# Patient Record
Sex: FEMALE | Race: OTHER | HISPANIC OR LATINO | Employment: FULL TIME | URBAN - METROPOLITAN AREA
[De-identification: names, ages, dates, MRNs, and addresses within clinical notes are randomized per-mention and may not be internally consistent; named-entity substitution may affect disease eponyms.]

---

## 2021-05-19 ENCOUNTER — TELEPHONE (OUTPATIENT)
Dept: NEUROLOGY | Facility: CLINIC | Age: 61
End: 2021-05-19

## 2022-02-02 ENCOUNTER — OFFICE VISIT (OUTPATIENT)
Dept: NEUROLOGY | Facility: CLINIC | Age: 62
End: 2022-02-02
Payer: COMMERCIAL

## 2022-02-02 VITALS
HEART RATE: 82 BPM | HEIGHT: 61 IN | DIASTOLIC BLOOD PRESSURE: 84 MMHG | SYSTOLIC BLOOD PRESSURE: 115 MMHG | BODY MASS INDEX: 28.05 KG/M2 | WEIGHT: 148.56 LBS

## 2022-02-02 DIAGNOSIS — R41.3 OTHER AMNESIA: ICD-10-CM

## 2022-02-02 DIAGNOSIS — G31.84 MILD COGNITIVE IMPAIRMENT WITH MEMORY LOSS: Primary | ICD-10-CM

## 2022-02-02 DIAGNOSIS — F32.A DEPRESSION, UNSPECIFIED DEPRESSION TYPE: ICD-10-CM

## 2022-02-02 PROBLEM — E78.00 HYPERCHOLESTEROLEMIA: Status: ACTIVE | Noted: 2020-07-04

## 2022-02-02 PROCEDURE — 3074F PR MOST RECENT SYSTOLIC BLOOD PRESSURE < 130 MM HG: ICD-10-PCS | Mod: CPTII,S$GLB,, | Performed by: PSYCHIATRY & NEUROLOGY

## 2022-02-02 PROCEDURE — 1159F PR MEDICATION LIST DOCUMENTED IN MEDICAL RECORD: ICD-10-PCS | Mod: CPTII,S$GLB,, | Performed by: PSYCHIATRY & NEUROLOGY

## 2022-02-02 PROCEDURE — 3079F DIAST BP 80-89 MM HG: CPT | Mod: CPTII,S$GLB,, | Performed by: PSYCHIATRY & NEUROLOGY

## 2022-02-02 PROCEDURE — 99999 PR PBB SHADOW E&M-EST. PATIENT-LVL IV: CPT | Mod: PBBFAC,,, | Performed by: PSYCHIATRY & NEUROLOGY

## 2022-02-02 PROCEDURE — 1159F MED LIST DOCD IN RCRD: CPT | Mod: CPTII,S$GLB,, | Performed by: PSYCHIATRY & NEUROLOGY

## 2022-02-02 PROCEDURE — 99999 PR PBB SHADOW E&M-EST. PATIENT-LVL IV: ICD-10-PCS | Mod: PBBFAC,,, | Performed by: PSYCHIATRY & NEUROLOGY

## 2022-02-02 PROCEDURE — 1160F RVW MEDS BY RX/DR IN RCRD: CPT | Mod: CPTII,S$GLB,, | Performed by: PSYCHIATRY & NEUROLOGY

## 2022-02-02 PROCEDURE — 99205 PR OFFICE/OUTPT VISIT, NEW, LEVL V, 60-74 MIN: ICD-10-PCS | Mod: S$GLB,,, | Performed by: PSYCHIATRY & NEUROLOGY

## 2022-02-02 PROCEDURE — 99205 OFFICE O/P NEW HI 60 MIN: CPT | Mod: S$GLB,,, | Performed by: PSYCHIATRY & NEUROLOGY

## 2022-02-02 PROCEDURE — 3008F PR BODY MASS INDEX (BMI) DOCUMENTED: ICD-10-PCS | Mod: CPTII,S$GLB,, | Performed by: PSYCHIATRY & NEUROLOGY

## 2022-02-02 PROCEDURE — 3074F SYST BP LT 130 MM HG: CPT | Mod: CPTII,S$GLB,, | Performed by: PSYCHIATRY & NEUROLOGY

## 2022-02-02 PROCEDURE — 3079F PR MOST RECENT DIASTOLIC BLOOD PRESSURE 80-89 MM HG: ICD-10-PCS | Mod: CPTII,S$GLB,, | Performed by: PSYCHIATRY & NEUROLOGY

## 2022-02-02 PROCEDURE — 1160F PR REVIEW ALL MEDS BY PRESCRIBER/CLIN PHARMACIST DOCUMENTED: ICD-10-PCS | Mod: CPTII,S$GLB,, | Performed by: PSYCHIATRY & NEUROLOGY

## 2022-02-02 PROCEDURE — 3008F BODY MASS INDEX DOCD: CPT | Mod: CPTII,S$GLB,, | Performed by: PSYCHIATRY & NEUROLOGY

## 2022-02-02 RX ORDER — DEXAMETHASONE SODIUM PHOSPHATE 4 MG/ML
1 INJECTION, SOLUTION INTRA-ARTICULAR; INTRALESIONAL; INTRAMUSCULAR; INTRAVENOUS; SOFT TISSUE
COMMUNITY
End: 2022-03-17

## 2022-02-02 RX ORDER — DULOXETIN HYDROCHLORIDE 30 MG/1
30 CAPSULE, DELAYED RELEASE ORAL DAILY
Qty: 30 CAPSULE | Refills: 2 | Status: SHIPPED | OUTPATIENT
Start: 2022-02-02 | End: 2022-03-17

## 2022-02-02 RX ORDER — ESTRADIOL 0.1 MG/G
CREAM VAGINAL
COMMUNITY
End: 2022-11-17 | Stop reason: SDUPTHER

## 2022-02-04 ENCOUNTER — PATIENT MESSAGE (OUTPATIENT)
Dept: NEUROLOGY | Facility: CLINIC | Age: 62
End: 2022-02-04
Payer: COMMERCIAL

## 2022-02-23 ENCOUNTER — OFFICE VISIT (OUTPATIENT)
Dept: NEUROLOGY | Facility: CLINIC | Age: 62
End: 2022-02-23
Payer: COMMERCIAL

## 2022-02-23 DIAGNOSIS — F43.22 ADJUSTMENT DISORDER WITH ANXIOUS MOOD: ICD-10-CM

## 2022-02-23 DIAGNOSIS — F32.A DEPRESSION, UNSPECIFIED DEPRESSION TYPE: ICD-10-CM

## 2022-02-23 DIAGNOSIS — G31.84 MILD COGNITIVE IMPAIRMENT WITH MEMORY LOSS: Primary | ICD-10-CM

## 2022-02-23 PROCEDURE — 99499 UNLISTED E&M SERVICE: CPT | Mod: 95,,, | Performed by: CLINICAL NEUROPSYCHOLOGIST

## 2022-02-23 PROCEDURE — 99499 NO LOS: ICD-10-PCS | Mod: 95,,, | Performed by: CLINICAL NEUROPSYCHOLOGIST

## 2022-02-23 PROCEDURE — 96116 PR NEUROBEHAVIORAL STATUS EXAM BY PSYCH/PHYS: ICD-10-PCS | Mod: 95,,, | Performed by: CLINICAL NEUROPSYCHOLOGIST

## 2022-02-23 PROCEDURE — 96116 NUBHVL XM PHYS/QHP 1ST HR: CPT | Mod: 95,,, | Performed by: CLINICAL NEUROPSYCHOLOGIST
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
## 2022-03-14 ENCOUNTER — OFFICE VISIT (OUTPATIENT)
Dept: NEUROLOGY | Facility: CLINIC | Age: 62
End: 2022-03-14
Payer: COMMERCIAL

## 2022-03-14 DIAGNOSIS — F32.A DEPRESSION, UNSPECIFIED DEPRESSION TYPE: ICD-10-CM

## 2022-03-14 DIAGNOSIS — G31.84 MILD COGNITIVE IMPAIRMENT WITH MEMORY LOSS: Primary | ICD-10-CM

## 2022-03-14 DIAGNOSIS — R41.3 MEMORY LOSS: ICD-10-CM

## 2022-03-14 DIAGNOSIS — F43.23 ADJUSTMENT DISORDER WITH MIXED ANXIETY AND DEPRESSED MOOD: ICD-10-CM

## 2022-03-14 PROCEDURE — 96133 NRPSYC TST EVAL PHYS/QHP EA: CPT | Mod: S$GLB,,, | Performed by: CLINICAL NEUROPSYCHOLOGIST

## 2022-03-14 PROCEDURE — 96138 PSYCL/NRPSYC TECH 1ST: CPT | Mod: S$GLB,,, | Performed by: CLINICAL NEUROPSYCHOLOGIST

## 2022-03-14 PROCEDURE — 96132 NRPSYC TST EVAL PHYS/QHP 1ST: CPT | Mod: S$GLB,,, | Performed by: CLINICAL NEUROPSYCHOLOGIST

## 2022-03-14 PROCEDURE — 99999 PR PBB SHADOW E&M-EST. PATIENT-LVL II: ICD-10-PCS | Mod: PBBFAC,,,

## 2022-03-14 PROCEDURE — 96139 PSYCL/NRPSYC TST TECH EA: CPT | Mod: S$GLB,,, | Performed by: CLINICAL NEUROPSYCHOLOGIST

## 2022-03-14 PROCEDURE — 96139 PR PSYCH/NEUROPSYCH TEST ADMIN/SCORING, BY TECH, 2+ TESTS, EA ADDTL 30 MIN: ICD-10-PCS | Mod: S$GLB,,, | Performed by: CLINICAL NEUROPSYCHOLOGIST

## 2022-03-14 PROCEDURE — 96132 PR NEUROPSYCHOLOGIC TEST EVAL SVCS, 1ST HR: ICD-10-PCS | Mod: S$GLB,,, | Performed by: CLINICAL NEUROPSYCHOLOGIST

## 2022-03-14 PROCEDURE — 99499 UNLISTED E&M SERVICE: CPT | Mod: S$GLB,,, | Performed by: CLINICAL NEUROPSYCHOLOGIST

## 2022-03-14 PROCEDURE — 96133 PR NEUROPSYCHOLOGIC TEST EVAL SVCS, EA ADDTL HR: ICD-10-PCS | Mod: S$GLB,,, | Performed by: CLINICAL NEUROPSYCHOLOGIST

## 2022-03-14 PROCEDURE — 96138 PR PSYCH/NEUROPSYCH TEST ADMIN/SCORING, BY TECH, 2+ TESTS, 1ST 30 MIN: ICD-10-PCS | Mod: S$GLB,,, | Performed by: CLINICAL NEUROPSYCHOLOGIST

## 2022-03-14 PROCEDURE — 99999 PR PBB SHADOW E&M-EST. PATIENT-LVL II: CPT | Mod: PBBFAC,,,

## 2022-03-14 PROCEDURE — 99499 NO LOS: ICD-10-PCS | Mod: S$GLB,,, | Performed by: CLINICAL NEUROPSYCHOLOGIST

## 2022-03-17 ENCOUNTER — OFFICE VISIT (OUTPATIENT)
Dept: NEUROLOGY | Facility: CLINIC | Age: 62
End: 2022-03-17
Payer: COMMERCIAL

## 2022-03-17 VITALS
BODY MASS INDEX: 27.68 KG/M2 | HEIGHT: 61 IN | SYSTOLIC BLOOD PRESSURE: 120 MMHG | HEART RATE: 86 BPM | DIASTOLIC BLOOD PRESSURE: 87 MMHG | WEIGHT: 146.63 LBS

## 2022-03-17 DIAGNOSIS — F32.A DEPRESSION, UNSPECIFIED DEPRESSION TYPE: ICD-10-CM

## 2022-03-17 DIAGNOSIS — G31.84 MILD COGNITIVE IMPAIRMENT WITH MEMORY LOSS: Primary | ICD-10-CM

## 2022-03-17 PROCEDURE — 99215 PR OFFICE/OUTPT VISIT, EST, LEVL V, 40-54 MIN: ICD-10-PCS | Mod: S$GLB,,, | Performed by: PSYCHIATRY & NEUROLOGY

## 2022-03-17 PROCEDURE — 1159F MED LIST DOCD IN RCRD: CPT | Mod: CPTII,S$GLB,, | Performed by: PSYCHIATRY & NEUROLOGY

## 2022-03-17 PROCEDURE — 99999 PR PBB SHADOW E&M-EST. PATIENT-LVL III: ICD-10-PCS | Mod: PBBFAC,,, | Performed by: PSYCHIATRY & NEUROLOGY

## 2022-03-17 PROCEDURE — 3079F PR MOST RECENT DIASTOLIC BLOOD PRESSURE 80-89 MM HG: ICD-10-PCS | Mod: CPTII,S$GLB,, | Performed by: PSYCHIATRY & NEUROLOGY

## 2022-03-17 PROCEDURE — 99999 PR PBB SHADOW E&M-EST. PATIENT-LVL III: CPT | Mod: PBBFAC,,, | Performed by: PSYCHIATRY & NEUROLOGY

## 2022-03-17 PROCEDURE — 1159F PR MEDICATION LIST DOCUMENTED IN MEDICAL RECORD: ICD-10-PCS | Mod: CPTII,S$GLB,, | Performed by: PSYCHIATRY & NEUROLOGY

## 2022-03-17 PROCEDURE — 1160F PR REVIEW ALL MEDS BY PRESCRIBER/CLIN PHARMACIST DOCUMENTED: ICD-10-PCS | Mod: CPTII,S$GLB,, | Performed by: PSYCHIATRY & NEUROLOGY

## 2022-03-17 PROCEDURE — 3074F PR MOST RECENT SYSTOLIC BLOOD PRESSURE < 130 MM HG: ICD-10-PCS | Mod: CPTII,S$GLB,, | Performed by: PSYCHIATRY & NEUROLOGY

## 2022-03-17 PROCEDURE — 99215 OFFICE O/P EST HI 40 MIN: CPT | Mod: S$GLB,,, | Performed by: PSYCHIATRY & NEUROLOGY

## 2022-03-17 PROCEDURE — 3074F SYST BP LT 130 MM HG: CPT | Mod: CPTII,S$GLB,, | Performed by: PSYCHIATRY & NEUROLOGY

## 2022-03-17 PROCEDURE — 3008F PR BODY MASS INDEX (BMI) DOCUMENTED: ICD-10-PCS | Mod: CPTII,S$GLB,, | Performed by: PSYCHIATRY & NEUROLOGY

## 2022-03-17 PROCEDURE — 3079F DIAST BP 80-89 MM HG: CPT | Mod: CPTII,S$GLB,, | Performed by: PSYCHIATRY & NEUROLOGY

## 2022-03-17 PROCEDURE — 1160F RVW MEDS BY RX/DR IN RCRD: CPT | Mod: CPTII,S$GLB,, | Performed by: PSYCHIATRY & NEUROLOGY

## 2022-03-17 PROCEDURE — 3008F BODY MASS INDEX DOCD: CPT | Mod: CPTII,S$GLB,, | Performed by: PSYCHIATRY & NEUROLOGY

## 2022-03-17 RX ORDER — DULOXETIN HYDROCHLORIDE 60 MG/1
60 CAPSULE, DELAYED RELEASE ORAL DAILY
Qty: 30 CAPSULE | Refills: 11 | Status: SHIPPED | OUTPATIENT
Start: 2022-03-17 | End: 2022-12-13

## 2022-03-17 NOTE — PROGRESS NOTES
[facility] NEUROLOGY  [facility], [place]    Date: 3/17/22  Patient Name: Dot Coleman   MRN: [id number]   PCP: Primary Doctor No  Referring Provider: No ref. provider found    Chief Complaint: follow up for Depression and changes in memory  Subjective:     03/17/22:  Patient presents today for follow-up regarding depression and changes in memory.  She shares that she has been tolerating Cymbalta well with no noted side effects.  She continues to misplace keys and phone and gives a couple of examples these extremes.  She has had the chance to meet with neuropsychology recently and complete testing.  Her follow-up with explanation of results is upcoming in the next week.  CT head was completed that showed very mild symmetric diffuse atrophy; reviewed with the patient at the time of the encounter were all questions and concerns were answered to her satisfaction.  No new medical or neurological complaints otherwise.    No difference with the cymbalta, misplacing keys and phone still, has met with neuropsych,      ============================================  02/02/22 HPI:   Ms. Dot Coleman is a 61 y.o. female presenting to discuss changes in memory.  She shares that after retired a couple of years ago, she has moved back to the [facility] from [place] where she lived for over 30 years.  After the move, she began to notice issues with her short-term memory.  She gives the example of forgetting her dog outside for a prolonged period of time once.  She typically misplaces small objects such as keys or remote around the house.  She has had at least 1 instance of leaving the stove on unattended.  Overall, cognitive fog is most bothersome.  She notes waking up in the morning and having to focus and struggle to recall what day of the week it is or what she has to do that day.  Picked up a book to read recently and eventually realized that she had read the book just a few months earlier.  Typically  cannot remember what she watched on TV the day before.  Daughter has brought up in the last year that she frequently forgets the conversations or information they have discussed.    Patient completes all of her activities of daily living independently including cooking, cleaning, bathing, driving, handling finances.  Denies any accidents driving or major issues navigating.  She does however admit to significant anxiety about driving to or experiencing new places.  She is quite tearful as she describes anxiety leading up to today's visit and driving to our clinic in an area where she is not completely familiar.    Denies noticing any tremors or changes in gait.  No major falls.  Denies a family history of neuro degenerative processes.    Admits to depressed mood.  She tearfully explains that her cognitive fog and anxiety are very abnormal for her.  She is obviously distressed as she explains being somewhat isolated now as she lives alone.   passed away in 2005 and she raised her daughters as a single parent.  Few hobbies, no changes in appetite, no thoughts of self-harm or harm to others, admits to issues with insomnia over the last several months.    The patient does point out that she feels as if her memory was becoming a problem even before changes in mood.    At the time of today's encounter, the patient presents medical records from outside facility where she had received extensive neurological workup in late 2020. These results are scanned into chart media.  They include routine reversible causes of dementia screening such as B12, TSH and others.  Eeg report reviewed; normal.  These notes referenced a previously conducted MRI brain with evidence of diffuse atrophy but no reports of actual imaging was available for review at the time of today's encounter.  ============================================  CURRENT MEDS:  Current Outpatient Medications   Medication Sig Dispense Refill    estradioL (ESTRACE)  "0.01 % (0.1 mg/gram) vaginal cream estradiol 0.01% (0.1 mg/gram) vaginal cream      DULoxetine (CYMBALTA) 60 MG capsule Take 1 capsule (60 mg total) by mouth once daily. 30 capsule 11    ibuprofen (ADVIL,MOTRIN) 600 MG tablet Take 1 tablet (600 mg total) by mouth every 6 (six) hours as needed. (Patient not taking: No sig reported) 24 tablet 0     No current facility-administered medications for this visit.     ALLERGIES:  Review of patient's allergies indicates:   Allergen Reactions    Penicillins Other (See Comments)     Shakes       Peanut Hives     unknown        Objective:     Vitals:    03/17/22 0744   BP: 120/87   Pulse: 86   Weight: 66.5 kg (146 lb 9.7 oz)   Height: 5' 1" (1.549 m)     General:  Female in NAD, alert and awake, Aox3, well groomed. ?       Neurological Examination.    Mental status: AA&O x3; Affect/mood is euthymic/congruent; no aphasia noted       Cranial Nerves: II-XII grossly intact.      Muscle Function:  Symmetric use of bilateral upper and lower extremities against gravity     Gait: adequate casual gait with stride length and arm swing WNL.     Other:  OCT 2020 EEG: normal (available in media)    02/02/2022 MOCA: 25/30  Visuospatial/Executive 4, Naming 2, Attention 6, Language 3, Abstraction 2, Delayed Recall 2, Orientation 6    02/04/2022 CT head without contrast:  Impression:  Bilateral maxillary sinus mucosal disease.  No acute abnormality."    Assessment:   Dot Coleman is a 61 y.o. female presenting to follow-up for depression and changes in memory.  Given the patient's stable start to Cymbalta 30 mg daily with no side effects, we will continue to titrate dose to 60 mg daily at this time.  We will follow up with the results of neuro psychological evaluation after next Fridays visit and adjust plans accordingly.    Plan:     Problem List Items Addressed This Visit        Neuro    Mild cognitive impairment with memory loss - Primary    Relevant Medications    DULoxetine " (CYMBALTA) 60 MG capsule       Psychiatric    Depression    Relevant Medications    DULoxetine (CYMBALTA) 60 MG capsule        - follow-up with our clinic in 3 months or as needed.  - encouraged to participate in cognitively stimulating activities such as regular socialization, puzzles, reading.    I spent a total of 40 minutes on the day of the visit. This includes face to face time and non-face to face time preparing to see the patient (eg, review of tests), obtaining and/or reviewing separately obtained history, documenting clinical information in the electronic or other health record, independently interpreting results and communicating results to the patient/family/caregiver, or care coordinator.    A dictation device was used to produce this document. Use of such devices sometimes results in grammatical errors or replacement of words that sound similarly.    Rodrigo Rivera, DO

## 2022-03-21 ENCOUNTER — PATIENT MESSAGE (OUTPATIENT)
Dept: NEUROLOGY | Facility: CLINIC | Age: 62
End: 2022-03-21
Payer: COMMERCIAL

## 2022-03-21 PROBLEM — F43.23 ADJUSTMENT DISORDER WITH MIXED ANXIETY AND DEPRESSED MOOD: Status: ACTIVE | Noted: 2022-03-21

## 2022-03-21 PROBLEM — R41.3 MEMORY LOSS: Status: ACTIVE | Noted: 2022-02-02
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
## 2022-03-21 NOTE — PATIENT INSTRUCTIONS
PRACTICE GOOD COGNITIVE HYGIENE  Engage in regular exercise, which increases alertness and arousal and can improve attention and focus.  Consider lower impact exercises, such as yoga or light walking. Try to exercise for at least 150 minutes per week  Get a good night's sleep, as this can enhance alertness and cognition.  Eat healthy foods and balanced meals. It is notable that research indicates certain nutrients may aid in brain function, such as B vitamins (especially B6, B12, and folic acid), antioxidants (such as vitamins C and E, and beta carotene), and Omega-3 fatty acids. Here are some common tips for diet (Adopted from Sukhjinder et al, [facility], 2018):  Eat primarily plant-based foods, such as fruits and vegetables, whole grains, legumes   (beans) and nuts.  Limit refined carbohydrates (white pasta, bread, rice).  Replace butter with healthy fats such as olive oil.  Use herbs and spices instead of salt to flavor foods.  Limit red meat and processed meats to no more than a few times a month.  Avoid sugary sodas, bakery goods, and sweets.  Eat fish and poultry at least twice a week.  Keep your brain active. Find activities to stay mentally active, such as reading, games (cards, checkers), puzzles (crosswords, Sudoku, jig saw), crafts (models, woodworking), gardening, or participating in activities in the community.  Stay socially engaged. Continue staying active with your family and friends.    RESOURCE  Consider purchasing the book, High-Octane Brain: 5 Science-Based Steps to Sharpen Your Memory and Reduce Your Risk of Alzheimer's by Dr. Codie Ogden.    COGNITIVE TIPS AND STRATEGIES  The following tips and strategies are provided to help assist in daily activities:      Attention: Remember that inattention and lack of focus are major culprits to forgetting information so be sure and practice paying attention for adequate learning of information. If you rely on passive attention to remembering something (e.g.,  yeah, uh-huh approach), you'll find you cannot recall it later. I recommend the following to improve attention, which may aid in later recall:  1. Reduce distractions in the area as much as possible  2. Look at the person as they are speaking to you.   3. Paraphrase as they are speaking  4. Write down important pieces of information   5. Ask them to repeat if you zone out.  6. Have them simplify and reduce information that you need to attend to during conversation.  7. Have visual cues to remind you if you need to do something later.     Processing Speed:  1. Using multiple modalities (e.g., listening, writing notes, asking questions, recording) to learn new information is likely to allow additional time for processing, thus improving memory for the material.   2. Allowing sufficient time to complete tasks will reduce frustration and help to ensure completion.     Executive Functionin. Don't attempt to multi-task.  Separate tasks so that each can be completed one at a time  2. Consider using a calendar/day planner, as that may be effective to help you plan and stay on track.  Color-coding specific tasks by importance may add additional benefit to your planner  3. Break down large projects into smaller tasks and write down the steps to completing the task.  Taking notes while reading can help with recall.     Storing Information: Use the below strategies to help you further enhance how information is stored  1. Rehearse - Immediately after seeing/hearing something, try to recall it.  Wait a few minutes, then check again.  Gradually lengthen the intervals between rehearsals.  2. Repetition of learned material is critical to ensure storage of information to be learned. Self-test at home to ensure learning.  3. Write down important information to improve your attention and focus and to have something to look back on when you need to recall it.  4. Make sure the person doesn't rattle off, but presents in a  clear, logical, and unhurried manner.      Recalling Information:  1. Jog your memory - Lose something?  Think back to when you last had it.  What did you do next?  And after that?  Mentally walk yourself through each activity that followed.  Prodding your memory this way may enable you to recall the location of the missing item.  2. Use a cue - Symbolic reminders (the proverbial string around the finger) are helpful.  So too are memos, timers, calendar notes, etc.--keep them in visible, appropriate place  3. Get organized - Have fixed locations for all important papers, key phone numbers, medications, keys, wallet, glasses, tools, etc.  4. Develop routines - Routines can anchor memories so they do not drift away.

## 2022-03-25 ENCOUNTER — PATIENT MESSAGE (OUTPATIENT)
Dept: NEUROLOGY | Facility: CLINIC | Age: 62
End: 2022-03-25
Payer: COMMERCIAL

## 2022-06-17 ENCOUNTER — OFFICE VISIT (OUTPATIENT)
Dept: NEUROLOGY | Facility: CLINIC | Age: 62
End: 2022-06-17
Payer: COMMERCIAL

## 2022-06-17 VITALS
BODY MASS INDEX: 27.56 KG/M2 | DIASTOLIC BLOOD PRESSURE: 93 MMHG | HEART RATE: 69 BPM | WEIGHT: 146 LBS | HEIGHT: 61 IN | SYSTOLIC BLOOD PRESSURE: 136 MMHG

## 2022-06-17 DIAGNOSIS — G31.84 MILD COGNITIVE IMPAIRMENT WITH MEMORY LOSS: Primary | ICD-10-CM

## 2022-06-17 DIAGNOSIS — F32.A DEPRESSION, UNSPECIFIED DEPRESSION TYPE: ICD-10-CM

## 2022-06-17 PROCEDURE — 3075F SYST BP GE 130 - 139MM HG: CPT | Mod: CPTII,S$GLB,,

## 2022-06-17 PROCEDURE — 1159F MED LIST DOCD IN RCRD: CPT | Mod: CPTII,S$GLB,,

## 2022-06-17 PROCEDURE — 99213 OFFICE O/P EST LOW 20 MIN: CPT | Mod: S$GLB,,,

## 2022-06-17 PROCEDURE — 3008F PR BODY MASS INDEX (BMI) DOCUMENTED: ICD-10-PCS | Mod: CPTII,S$GLB,,

## 2022-06-17 PROCEDURE — 3080F DIAST BP >= 90 MM HG: CPT | Mod: CPTII,S$GLB,,

## 2022-06-17 PROCEDURE — 3075F PR MOST RECENT SYSTOLIC BLOOD PRESS GE 130-139MM HG: ICD-10-PCS | Mod: CPTII,S$GLB,,

## 2022-06-17 PROCEDURE — 99999 PR PBB SHADOW E&M-EST. PATIENT-LVL III: CPT | Mod: PBBFAC,,,

## 2022-06-17 PROCEDURE — 1159F PR MEDICATION LIST DOCUMENTED IN MEDICAL RECORD: ICD-10-PCS | Mod: CPTII,S$GLB,,

## 2022-06-17 PROCEDURE — 1160F PR REVIEW ALL MEDS BY PRESCRIBER/CLIN PHARMACIST DOCUMENTED: ICD-10-PCS | Mod: CPTII,S$GLB,,

## 2022-06-17 PROCEDURE — 1160F RVW MEDS BY RX/DR IN RCRD: CPT | Mod: CPTII,S$GLB,,

## 2022-06-17 PROCEDURE — 3008F BODY MASS INDEX DOCD: CPT | Mod: CPTII,S$GLB,,

## 2022-06-17 PROCEDURE — 3080F PR MOST RECENT DIASTOLIC BLOOD PRESSURE >= 90 MM HG: ICD-10-PCS | Mod: CPTII,S$GLB,,

## 2022-06-17 PROCEDURE — 99213 PR OFFICE/OUTPT VISIT, EST, LEVL III, 20-29 MIN: ICD-10-PCS | Mod: S$GLB,,,

## 2022-06-17 PROCEDURE — 99999 PR PBB SHADOW E&M-EST. PATIENT-LVL III: ICD-10-PCS | Mod: PBBFAC,,,

## 2022-06-17 NOTE — PROGRESS NOTES
[facility] NEUROLOGY  [facility], [place]    Date: 6/17/22  Patient Name: Dot Coleman   MRN: [id number]   PCP: Primary Doctor No  Referring Provider: No ref. provider found    Chief Complaint: follow up for Depression and changes in memory    Subjective:        HPI:   Ms. Dot Coleman is a 61 y.o. female presenting for follow-up regarding depression and changes in memory. Reports that duloxetine is well tolerated with no side effects. She has noticed that her mood has improved. Would like to stay on this medication at this time. She still has difficulty with her memory in that she continues to misplace things. Is able to complete all ADLs independently and drive without any issues navigating. No tremor, changes in gait, or falls. No new neurological complaints.     ===========================================  Per documentation of Dr. Rodrigo Rivera, DO on 3/17/22:  03/17/22:  Patient presents today for follow-up regarding depression and changes in memory.  She shares that she has been tolerating Cymbalta well with no noted side effects.  She continues to misplace keys and phone and gives a couple of examples these extremes.  She has had the chance to meet with neuropsychology recently and complete testing.  Her follow-up with explanation of results is upcoming in the next week.  CT head was completed that showed very mild symmetric diffuse atrophy; reviewed with the patient at the time of the encounter were all questions and concerns were answered to her satisfaction.  No new medical or neurological complaints otherwise.    No difference with the cymbalta, misplacing keys and phone still, has met with neuropsych,      ============================================  02/02/22 HPI:   Ms. Dot Coleman is a 61 y.o. female presenting to discuss changes in memory.  She shares that after retired a couple of years ago, she has moved back to the region from [place] where she lived for over 30  years.  After the move, she began to notice issues with her short-term memory.  She gives the example of forgetting her dog outside for a prolonged period of time once.  She typically misplaces small objects such as keys or remote around the house.  She has had at least 1 instance of leaving the stove on unattended.  Overall, cognitive fog is most bothersome.  She notes waking up in the morning and having to focus and struggle to recall what day of the week it is or what she has to do that day.  Picked up a book to read recently and eventually realized that she had read the book just a few months earlier.  Typically cannot remember what she watched on TV the day before.  Daughter has brought up in the last year that she frequently forgets the conversations or information they have discussed.    Patient completes all of her activities of daily living independently including cooking, cleaning, bathing, driving, handling finances.  Denies any accidents driving or major issues navigating.  She does however admit to significant anxiety about driving to or experiencing new places.  She is quite tearful as she describes anxiety leading up to today's visit and driving to our clinic in an area where she is not completely familiar.    Denies noticing any tremors or changes in gait.  No major falls.  Denies a family history of neuro degenerative processes.    Admits to depressed mood.  She tearfully explains that her cognitive fog and anxiety are very abnormal for her.  She is obviously distressed as she explains being somewhat isolated now as she lives alone.   passed away in 2005 and she raised her daughters as a single parent.  Few hobbies, no changes in appetite, no thoughts of self-harm or harm to others, admits to issues with insomnia over the last several months.    The patient does point out that she feels as if her memory was becoming a problem even before changes in mood.    At the time of today's encounter,  the patient presents medical records from outside facility where she had received extensive neurological workup in late 2020. These results are scanned into chart media.  They include routine reversible causes of dementia screening such as B12, TSH and others.  Eeg report reviewed; normal.  These notes referenced a previously conducted MRI brain with evidence of diffuse atrophy but no reports of actual imaging was available for review at the time of today's encounter.  ============================================      PAST MEDICAL HISTORY:  No past medical history on file.    PAST SURGICAL HISTORY:  No past surgical history on file.    CURRENT MEDS:  Current Outpatient Medications   Medication Sig Dispense Refill    DULoxetine (CYMBALTA) 60 MG capsule Take 1 capsule (60 mg total) by mouth once daily. 30 capsule 11    estradioL (ESTRACE) 0.01 % (0.1 mg/gram) vaginal cream estradiol 0.01% (0.1 mg/gram) vaginal cream      estradioL (ESTRACE) 0.01 % (0.1 mg/gram) vaginal cream Place 1 g vaginally 3 (three) times a week 42.5 g 2    ibuprofen (ADVIL,MOTRIN) 600 MG tablet Take 1 tablet (600 mg total) by mouth every 6 (six) hours as needed. (Patient not taking: No sig reported) 24 tablet 0    rosuvastatin (CRESTOR) 10 MG tablet Take 1 tablet (10 mg total) by mouth daily 90 tablet 1     No current facility-administered medications for this visit.       ALLERGIES:  Review of patient's allergies indicates:   Allergen Reactions    Penicillins Other (See Comments)     Shakes       Peanut Hives     unknown       FAMILY HISTORY:  No family history on file.    SOCIAL HISTORY:  Social History     Tobacco Use    Smoking status: Never Smoker    Smokeless tobacco: Never Used       Review of Systems:  Gen: no fever, no chills, no generalized feeling of weakness   HEENT: no double vision, no blurred vision, no eye pain, no eye exudates. no nasal congestion,   no traumatic injury of head, no neck pain, no neck stiffness. no  "photophobia or phonophobia at this time. ?    Heart: no chest pain, no SOB    Lungs: no SOB, no cough    MSK: no weakness of legs, intact ROM    ABD: no abd pain, no N/V/D/C, no difficulty with defecation.    Extremities: No leg pain, no edema.       Objective:     Vitals:    06/17/22 0751   Weight: 66.2 kg (146 lb)   Height: 5' 1" (1.549 m)       General: Female in NAD, alert and awake, Aox3, well groomed. ?    ? ?    HEENT: Head is NC/AT EOMI, pupil size: 4 mm B/L, no nystagmus noted; hearing grossly intact b/l.      Neck: Supple. no nuchal rigidity.      Cardiovascular: well perfused, no cyanosis        Respiratory: Symmetric chest rise noted       Musculoskeletal: Muscle tone noted to be adequate for patient age, muscle mass is WNL. No spontaneous movements or fasciculations noted during this examination.       Extremities: No pedal edema or calf tenderness. No cogwheel rigidity noted on B/L UE extremities.       Neurological Examination.    Mental status: AA&O x3; Affect/mood is euthymic/congruent; no aphasia noted during examination. Patient answers simple questions appropriately & follows simple commands; no dysarthria or expressive aphasia; no bert-neglect or extinction. Vocabulary/word finding: excellent.       Cranial Nerves: II-XII grossly intact.     Muscle Function: Tone WNL and Muscle bulk WNL. Able to symmetrically use bilateral upper and lower extremities against gravity.    Gait: adequate casual gait with stride length and arm swing WNL.       Other:  OCT 2020 EEG: normal (available in media)    02/02/2022 MOCA: 25/30  Visuospatial/Executive 4, Naming 2, Attention 6, Language 3, Abstraction 2, Delayed Recall 2, Orientation 6    02/04/2022 CT head without contrast:  Impression:  Bilateral maxillary sinus mucosal disease.  No acute abnormality."      Assessment:   Dot Coleman is a 61 y.o. female presenting for follow-up for depression and changes in memory.  Given that she is stable on duloxetine " 60 mg daily with no side effects and noticed improvement in mood, we will continue with this management at this time. We discussed that by managing depression, the goal is that there will be improvement in memory over time. If at any point, she would like to trial a different medication option, we discussed reaching out to PCP or initiating a referral to psychiatry. We will plan to repeat MOCA in 6 months to compare the score to that of 2/2/22. I will call check in by phone in 3 months to assess and may adjust the plan accordingly. She was instructed to reach out to our office sooner if any notable changes or worsening in mood or memory.    Plan:     Problem List Items Addressed This Visit    None       - Continue duloxetine 60 mg daily  - follow-up with our clinic in 6 months or as needed. Will repeat MOCA at 6 month visit.  - Will check in by phone in 3 months  - encouraged to participate in cognitively stimulating activities such as regular socialization, puzzles, reading.    I spent a total of 25 minutes on the day of the visit. This includes face to face time and non-face to face time preparing to see the patient (eg, review of tests), obtaining and/or reviewing separately obtained history, documenting clinical information in the electronic or other health record, independently interpreting results and communicating results to the patient/family/caregiver, or care coordinator. Rodrigo Rivera DO was available in clinic throughout this encounter.    Araceli Cueva PA-C

## 2022-07-07 ENCOUNTER — OFFICE VISIT (OUTPATIENT)
Dept: UROLOGY | Facility: CLINIC | Age: 62
End: 2022-07-07
Payer: COMMERCIAL

## 2022-07-07 VITALS
HEART RATE: 89 BPM | HEIGHT: 62 IN | WEIGHT: 143.75 LBS | BODY MASS INDEX: 26.45 KG/M2 | SYSTOLIC BLOOD PRESSURE: 138 MMHG | DIASTOLIC BLOOD PRESSURE: 83 MMHG

## 2022-07-07 DIAGNOSIS — N20.1 LEFT URETERAL STONE: Primary | ICD-10-CM

## 2022-07-07 DIAGNOSIS — N13.4 HYDROURETER: ICD-10-CM

## 2022-07-07 DIAGNOSIS — N20.0 KIDNEY STONE: ICD-10-CM

## 2022-07-07 LAB
BILIRUB SERPL-MCNC: NEGATIVE MG/DL
BLOOD URINE, POC: ABNORMAL
CLARITY, POC UA: CLEAR
COLOR, POC UA: YELLOW
GLUCOSE UR QL STRIP: NORMAL
KETONES UR QL STRIP: NEGATIVE
LEUKOCYTE ESTERASE URINE, POC: NEGATIVE
NITRITE, POC UA: NEGATIVE
PH, POC UA: 8
PROTEIN, POC: NEGATIVE
SPECIFIC GRAVITY, POC UA: 1
UROBILINOGEN, POC UA: NORMAL

## 2022-07-07 PROCEDURE — 3075F SYST BP GE 130 - 139MM HG: CPT | Mod: CPTII,S$GLB,, | Performed by: UROLOGY

## 2022-07-07 PROCEDURE — 3079F DIAST BP 80-89 MM HG: CPT | Mod: CPTII,S$GLB,, | Performed by: UROLOGY

## 2022-07-07 PROCEDURE — 1160F RVW MEDS BY RX/DR IN RCRD: CPT | Mod: CPTII,S$GLB,, | Performed by: UROLOGY

## 2022-07-07 PROCEDURE — 99203 OFFICE O/P NEW LOW 30 MIN: CPT | Mod: S$GLB,,, | Performed by: UROLOGY

## 2022-07-07 PROCEDURE — 1159F MED LIST DOCD IN RCRD: CPT | Mod: CPTII,S$GLB,, | Performed by: UROLOGY

## 2022-07-07 PROCEDURE — 81002 POCT URINE DIPSTICK WITHOUT MICROSCOPE: ICD-10-PCS | Mod: S$GLB,,, | Performed by: UROLOGY

## 2022-07-07 PROCEDURE — 3008F PR BODY MASS INDEX (BMI) DOCUMENTED: ICD-10-PCS | Mod: CPTII,S$GLB,, | Performed by: UROLOGY

## 2022-07-07 PROCEDURE — 99999 PR PBB SHADOW E&M-EST. PATIENT-LVL IV: CPT | Mod: PBBFAC,,, | Performed by: UROLOGY

## 2022-07-07 PROCEDURE — 3079F PR MOST RECENT DIASTOLIC BLOOD PRESSURE 80-89 MM HG: ICD-10-PCS | Mod: CPTII,S$GLB,, | Performed by: UROLOGY

## 2022-07-07 PROCEDURE — 1159F PR MEDICATION LIST DOCUMENTED IN MEDICAL RECORD: ICD-10-PCS | Mod: CPTII,S$GLB,, | Performed by: UROLOGY

## 2022-07-07 PROCEDURE — 99203 PR OFFICE/OUTPT VISIT, NEW, LEVL III, 30-44 MIN: ICD-10-PCS | Mod: S$GLB,,, | Performed by: UROLOGY

## 2022-07-07 PROCEDURE — 3008F BODY MASS INDEX DOCD: CPT | Mod: CPTII,S$GLB,, | Performed by: UROLOGY

## 2022-07-07 PROCEDURE — 3075F PR MOST RECENT SYSTOLIC BLOOD PRESS GE 130-139MM HG: ICD-10-PCS | Mod: CPTII,S$GLB,, | Performed by: UROLOGY

## 2022-07-07 PROCEDURE — 81002 URINALYSIS NONAUTO W/O SCOPE: CPT | Mod: S$GLB,,, | Performed by: UROLOGY

## 2022-07-07 PROCEDURE — 99999 PR PBB SHADOW E&M-EST. PATIENT-LVL IV: ICD-10-PCS | Mod: PBBFAC,,, | Performed by: UROLOGY

## 2022-07-07 PROCEDURE — 1160F PR REVIEW ALL MEDS BY PRESCRIBER/CLIN PHARMACIST DOCUMENTED: ICD-10-PCS | Mod: CPTII,S$GLB,, | Performed by: UROLOGY

## 2022-07-07 PROCEDURE — 82365 CALCULUS SPECTROSCOPY: CPT | Performed by: UROLOGY

## 2022-07-07 RX ORDER — VALACYCLOVIR HYDROCHLORIDE 1 G/1
1000 TABLET, FILM COATED ORAL 2 TIMES DAILY
COMMUNITY
Start: 2022-06-13 | End: 2022-07-22

## 2022-07-07 NOTE — PROGRESS NOTES
Subjective:       Patient ID: Dot Coleman is a 61 y.o. female.    Chief Complaint: Nephrolithiasis     This is a 61 y.o.  female patient that is new to me. She was recently in ED and had left flank pain.  CT showed left 5mm UVJ stone with hydronephrosis, bilateral NOS.  Notes severe pain at the time but since decreased in severity and only mild.  Passed a stone.  No N/V/F/C.  Notes h/o stones, treated years ago with what sounds like ureteroscopy.       I personally reviewed the images: CT 7/1/22 as above      Lab Results   Component Value Date    CREATININE 0.82 07/01/2022       ---  PMH/PSH/Medications/Allergies/Social history reviewed and as in chart.    Review of Systems   Constitutional: Negative for activity change, chills, fatigue and fever.   Respiratory: Negative for cough, chest tightness and shortness of breath.    Cardiovascular: Negative for chest pain.   Gastrointestinal: Negative for abdominal distention, abdominal pain, nausea and vomiting.   Genitourinary: Negative for difficulty urinating, flank pain, hematuria and pelvic pain.   Musculoskeletal: Negative for back pain and gait problem.       Objective:      Physical Exam  HENT:      Head: Normocephalic.   Pulmonary:      Effort: Pulmonary effort is normal.   Abdominal:      General: Abdomen is flat.   Musculoskeletal:      Cervical back: Normal range of motion.   Skin:     General: Skin is warm.   Neurological:      General: No focal deficit present.      Mental Status: She is alert.         Assessment:     Problem Noted   Kidney Stone 7/7/2022    Bilateral stones, left up to 7mm and mutiple, right 4mm and single stone  H/o treatment in past  Left cortical stones     Left Ureteral Stone 7/7/2022    5mm left UVJ  Passed stone that is nearly 5mm, decreased pain         Plan:     1. Discussed that likely passed ureteral stone.  Will send for analysis  2. Bilateral stones, reviewed imaging with patient.  Discussed observation vs URS vs ESWL.   Multiple stones would likely require URS on left possible right ESWL vs URS>  Wishes to observe at current  3. Follow up in 2 weeks with ANTONIO and KUB to ensure hydronephrosis resolved    Sukhdeep Joya MD

## 2022-07-14 LAB
COMPN STONE: NORMAL
SPECIMEN SOURCE: NORMAL
STONE ANALYSIS IR-IMP: NORMAL

## 2022-07-21 ENCOUNTER — TELEPHONE (OUTPATIENT)
Dept: UROLOGY | Facility: CLINIC | Age: 62
End: 2022-07-21
Payer: COMMERCIAL

## 2022-07-21 NOTE — TELEPHONE ENCOUNTER
----- Message from Norris Cadet sent at 7/21/2022 11:30 AM CDT -----  Contact: pt  Type: Requesting to speak with nurse        Who Called: PT  Regarding: would like to go over results  Would the patient rather a call back or a response via [facility]? Call back  Best Call Back Number: [phone]  Additional Information:

## 2022-07-21 NOTE — TELEPHONE ENCOUNTER
Returned call, informed follow up was recommended after testing.  Had completed today.  Options of in person, virtual, or audio appointment.  Patient opted for audio appointment.  Scheduled for tomorrow at 1120 hours.  Informed Dr Joya will contact her at this time.  She voiced understanding.

## 2022-07-22 ENCOUNTER — OFFICE VISIT (OUTPATIENT)
Dept: UROLOGY | Facility: CLINIC | Age: 62
End: 2022-07-22
Payer: COMMERCIAL

## 2022-07-22 DIAGNOSIS — N20.1 LEFT URETERAL STONE: ICD-10-CM

## 2022-07-22 DIAGNOSIS — N20.0 KIDNEY STONE: ICD-10-CM

## 2022-07-22 PROCEDURE — 1159F PR MEDICATION LIST DOCUMENTED IN MEDICAL RECORD: ICD-10-PCS | Mod: CPTII,95,, | Performed by: UROLOGY

## 2022-07-22 PROCEDURE — 1160F RVW MEDS BY RX/DR IN RCRD: CPT | Mod: CPTII,95,, | Performed by: UROLOGY

## 2022-07-22 PROCEDURE — 99213 PR OFFICE/OUTPT VISIT, EST, LEVL III, 20-29 MIN: ICD-10-PCS | Mod: 95,,, | Performed by: UROLOGY

## 2022-07-22 PROCEDURE — 99213 OFFICE O/P EST LOW 20 MIN: CPT | Mod: 95,,, | Performed by: UROLOGY

## 2022-07-22 PROCEDURE — 1160F PR REVIEW ALL MEDS BY PRESCRIBER/CLIN PHARMACIST DOCUMENTED: ICD-10-PCS | Mod: CPTII,95,, | Performed by: UROLOGY

## 2022-07-22 PROCEDURE — 1159F MED LIST DOCD IN RCRD: CPT | Mod: CPTII,95,, | Performed by: UROLOGY

## 2022-07-22 NOTE — PROGRESS NOTES
Established Patient - Audio Only Telehealth Visit     The patient location is: [place]  The chief complaint leading to consultation is: f/u  Visit type: Virtual visit with audio only (telephone)  Total time spent with patient: 5 minutes       The reason for the audio only service rather than synchronous audio and video virtual visit was related to technical difficulties or patient preference/necessity.     Each patient to whom I provide medical services by telemedicine is:  (1) informed of the relationship between the physician and patient and the respective role of any other health care provider with respect to management of the patient; and (2) notified that they may decline to receive medical services by telemedicine and may withdraw from such care at any time. Patient verbally consented to receive this service via voice-only telephone call.       HPI: As below     Assessment and plan:  As below    This service was not originating from a related E/M service provided within the previous 7 days nor will  to an E/M service or procedure within the next 24 hours or my soonest available appointment.  Prevailing standard of care was able to be met in this audio-only visit.      Subjective:       Patient ID: Dot Coleman is a 61 y.o. female.    Chief Complaint: No chief complaint on file.     This is a 61 y.o.  female patient With kidney stones.  CT showed left 5mm UVJ stone with hydronephrosis, bilateral NOS.  Notes severe pain at the time but since decreased in severity and only mild.  Passed a stone.  No N/V/F/C.  Notes h/o stones, treated years ago with what sounds like ureteroscopy.      7/22/22:  Follow up ANTONIO and kUB, no further flank pain.      I personally reviewed the images: CT 7/1/22 as above, ANTONIO and KUB as below      Lab Results   Component Value Date    CREATININE 0.82 07/01/2022       ---  PMH/PSH/Medications/Allergies/Social history reviewed and as in chart.    Review of Systems   Constitutional:  Negative for activity change, chills, fatigue and fever.   Respiratory: Negative for cough, chest tightness and shortness of breath.    Cardiovascular: Negative for chest pain.   Gastrointestinal: Negative for abdominal distention, abdominal pain, nausea and vomiting.   Genitourinary: Negative for difficulty urinating, flank pain, hematuria and pelvic pain.   Musculoskeletal: Negative for back pain and gait problem.       Objective:      Physical Exam  HENT:      Head: Normocephalic.   Pulmonary:      Effort: Pulmonary effort is normal.   Abdominal:      General: Abdomen is flat.   Musculoskeletal:      Cervical back: Normal range of motion.   Skin:     General: Skin is warm.   Neurological:      General: No focal deficit present.      Mental Status: She is alert.           ANTONIO 7/22:    Impression:     1. Bilateral nonobstructing nephrolithiasis.  2. Simple left renal cyst.    KUB 7/22:  FINDINGS:  Intestinal gas pattern is nonspecific.  No bowel obstruction or ileus.  Multiple left renal calculi measuring up to 6 mm in the lower pole.  Calcifications projecting over the left sacrum have a linear orientation and are favored to be vascular, though cannot entirely exclude ureteral calculus.  Numerous additional calcifications in the inferior pelvis are most likely phleboliths.     Assessment:     Problem Noted   Kidney Stone 7/7/2022    Bilateral stones, left up to 7mm and mutiple, right 4mm and single stone  H/o treatment in past  Left cortical stones     Left Ureteral Stone 7/7/2022    5mm left UVJ  Passed stone that is nearly 5mm, decreased pain  100% Calcium oxalate monohydrate          Plan:     1. Discussed compostition of passed ureteral stone, no further pain, ANTONIO no hydronephrosis  2. Discussed options for bilateral non obstructing stones L > R.  To have trip coming up in mid August do not feel needs intervention prior if any pain to left me know.  Will revisit ureteroscopy vs staged ESWL in future.  3. To  make appt to follow up later this year    Sukhdeep Joya MD

## 2022-09-19 ENCOUNTER — PATIENT MESSAGE (OUTPATIENT)
Dept: UROLOGY | Facility: CLINIC | Age: 62
End: 2022-09-19
Payer: COMMERCIAL

## 2022-10-20 ENCOUNTER — OFFICE VISIT (OUTPATIENT)
Dept: UROLOGY | Facility: CLINIC | Age: 62
End: 2022-10-20
Payer: COMMERCIAL

## 2022-10-20 VITALS
SYSTOLIC BLOOD PRESSURE: 121 MMHG | HEART RATE: 81 BPM | WEIGHT: 143.75 LBS | DIASTOLIC BLOOD PRESSURE: 85 MMHG | BODY MASS INDEX: 26.45 KG/M2 | HEIGHT: 62 IN

## 2022-10-20 DIAGNOSIS — N20.0 KIDNEY STONE: Primary | ICD-10-CM

## 2022-10-20 PROCEDURE — 99999 PR PBB SHADOW E&M-EST. PATIENT-LVL III: CPT | Mod: PBBFAC,,, | Performed by: UROLOGY

## 2022-10-20 PROCEDURE — 99999 PR PBB SHADOW E&M-EST. PATIENT-LVL III: ICD-10-PCS | Mod: PBBFAC,,, | Performed by: UROLOGY

## 2022-10-20 PROCEDURE — 3074F PR MOST RECENT SYSTOLIC BLOOD PRESSURE < 130 MM HG: ICD-10-PCS | Mod: CPTII,S$GLB,, | Performed by: UROLOGY

## 2022-10-20 PROCEDURE — 1159F PR MEDICATION LIST DOCUMENTED IN MEDICAL RECORD: ICD-10-PCS | Mod: CPTII,S$GLB,, | Performed by: UROLOGY

## 2022-10-20 PROCEDURE — 3079F DIAST BP 80-89 MM HG: CPT | Mod: CPTII,S$GLB,, | Performed by: UROLOGY

## 2022-10-20 PROCEDURE — 1160F RVW MEDS BY RX/DR IN RCRD: CPT | Mod: CPTII,S$GLB,, | Performed by: UROLOGY

## 2022-10-20 PROCEDURE — 1159F MED LIST DOCD IN RCRD: CPT | Mod: CPTII,S$GLB,, | Performed by: UROLOGY

## 2022-10-20 PROCEDURE — 1160F PR REVIEW ALL MEDS BY PRESCRIBER/CLIN PHARMACIST DOCUMENTED: ICD-10-PCS | Mod: CPTII,S$GLB,, | Performed by: UROLOGY

## 2022-10-20 PROCEDURE — 99213 OFFICE O/P EST LOW 20 MIN: CPT | Mod: S$GLB,,, | Performed by: UROLOGY

## 2022-10-20 PROCEDURE — 3074F SYST BP LT 130 MM HG: CPT | Mod: CPTII,S$GLB,, | Performed by: UROLOGY

## 2022-10-20 PROCEDURE — 3079F PR MOST RECENT DIASTOLIC BLOOD PRESSURE 80-89 MM HG: ICD-10-PCS | Mod: CPTII,S$GLB,, | Performed by: UROLOGY

## 2022-10-20 PROCEDURE — 99213 PR OFFICE/OUTPT VISIT, EST, LEVL III, 20-29 MIN: ICD-10-PCS | Mod: S$GLB,,, | Performed by: UROLOGY

## 2022-10-20 NOTE — PROGRESS NOTES
Subjective:       Patient ID: Dot Coleman is a 62 y.o. female.    Chief Complaint: No chief complaint on file.     This is a 62 y.o.  female patient With kidney stones.  CT showed left 5mm UVJ stone with hydronephrosis, bilateral NOS.  Notes severe pain at the time but since decreased in severity and only mild.  Passed a stone.  No N/V/F/C.  Notes h/o stones, treated years ago with what sounds like ureteroscopy.      Follow up ANTONIO and kUB showed stable stones, no further flank pain.      I personally reviewed the images: CT 7/1/22 as above, ANTONIO and KUB as below      Lab Results   Component Value Date    CREATININE 0.82 07/01/2022       ---  PMH/PSH/Medications/Allergies/Social history reviewed and as in chart.    Review of Systems   Constitutional:  Negative for activity change, chills, fatigue and fever.   Respiratory:  Negative for cough, chest tightness and shortness of breath.    Cardiovascular:  Negative for chest pain.   Gastrointestinal:  Negative for abdominal distention, abdominal pain, nausea and vomiting.   Genitourinary:  Negative for difficulty urinating, flank pain, hematuria and pelvic pain.   Musculoskeletal:  Negative for back pain and gait problem.     Objective:      Physical Exam  HENT:      Head: Normocephalic.   Pulmonary:      Effort: Pulmonary effort is normal.   Abdominal:      General: Abdomen is flat.   Musculoskeletal:      Cervical back: Normal range of motion.   Skin:     General: Skin is warm.   Neurological:      General: No focal deficit present.      Mental Status: She is alert.         ANTONIO 7/22:    Impression:     1. Bilateral nonobstructing nephrolithiasis.  2. Simple left renal cyst.    KUB 7/22:  FINDINGS:  Intestinal gas pattern is nonspecific.  No bowel obstruction or ileus.  Multiple left renal calculi measuring up to 6 mm in the lower pole.  Calcifications projecting over the left sacrum have a linear orientation and are favored to be vascular, though cannot entirely  exclude ureteral calculus.  Numerous additional calcifications in the inferior pelvis are most likely phleboliths.     Assessment:     Problem Noted   Kidney Stone 7/7/2022    Bilateral stones, left up to 7mm and mutiple, right 4mm and single stone  H/o treatment in past  Left cortical stones large 1+ cm    ANTONIO and KUB: right small stones not seen on KUB, left up to 6mm, larger LP stone is cortical.       Left Ureteral Stone 7/7/2022    5mm left UVJ  Passed stone that is nearly 5mm, decreased pain  100% Calcium oxalate monohydrate          Plan:     Discussed compostition of passed ureteral stone, no further pain, ANTONIO no hydronephrosis  Discussed options for bilateral non obstructing stones on left, ureteroscopy vs staged ESWL vs observation.  Currently she wishes to observe.  Follow-up in 6 months with KUB and renal ultrasound    Sukhdeep Joya MD

## 2022-11-17 ENCOUNTER — OFFICE VISIT (OUTPATIENT)
Dept: OBSTETRICS AND GYNECOLOGY | Facility: CLINIC | Age: 62
End: 2022-11-17
Payer: COMMERCIAL

## 2022-11-17 VITALS
DIASTOLIC BLOOD PRESSURE: 88 MMHG | WEIGHT: 143.19 LBS | BODY MASS INDEX: 26.19 KG/M2 | SYSTOLIC BLOOD PRESSURE: 134 MMHG

## 2022-11-17 DIAGNOSIS — Z12.31 ENCOUNTER FOR SCREENING MAMMOGRAM FOR BREAST CANCER: ICD-10-CM

## 2022-11-17 DIAGNOSIS — Z01.419 WELL WOMAN EXAM WITH ROUTINE GYNECOLOGICAL EXAM: Primary | ICD-10-CM

## 2022-11-17 DIAGNOSIS — Z12.4 SCREENING FOR CERVICAL CANCER: ICD-10-CM

## 2022-11-17 PROCEDURE — 1159F MED LIST DOCD IN RCRD: CPT | Mod: CPTII,S$GLB,, | Performed by: OBSTETRICS & GYNECOLOGY

## 2022-11-17 PROCEDURE — 3079F PR MOST RECENT DIASTOLIC BLOOD PRESSURE 80-89 MM HG: ICD-10-PCS | Mod: CPTII,S$GLB,, | Performed by: OBSTETRICS & GYNECOLOGY

## 2022-11-17 PROCEDURE — 3075F PR MOST RECENT SYSTOLIC BLOOD PRESS GE 130-139MM HG: ICD-10-PCS | Mod: CPTII,S$GLB,, | Performed by: OBSTETRICS & GYNECOLOGY

## 2022-11-17 PROCEDURE — 3008F PR BODY MASS INDEX (BMI) DOCUMENTED: ICD-10-PCS | Mod: CPTII,S$GLB,, | Performed by: OBSTETRICS & GYNECOLOGY

## 2022-11-17 PROCEDURE — 3079F DIAST BP 80-89 MM HG: CPT | Mod: CPTII,S$GLB,, | Performed by: OBSTETRICS & GYNECOLOGY

## 2022-11-17 PROCEDURE — 99386 PR PREVENTIVE VISIT,NEW,40-64: ICD-10-PCS | Mod: S$GLB,,, | Performed by: OBSTETRICS & GYNECOLOGY

## 2022-11-17 PROCEDURE — 99999 PR PBB SHADOW E&M-EST. PATIENT-LVL III: ICD-10-PCS | Mod: PBBFAC,,, | Performed by: OBSTETRICS & GYNECOLOGY

## 2022-11-17 PROCEDURE — 99386 PREV VISIT NEW AGE 40-64: CPT | Mod: S$GLB,,, | Performed by: OBSTETRICS & GYNECOLOGY

## 2022-11-17 PROCEDURE — 99999 PR PBB SHADOW E&M-EST. PATIENT-LVL III: CPT | Mod: PBBFAC,,, | Performed by: OBSTETRICS & GYNECOLOGY

## 2022-11-17 PROCEDURE — 3008F BODY MASS INDEX DOCD: CPT | Mod: CPTII,S$GLB,, | Performed by: OBSTETRICS & GYNECOLOGY

## 2022-11-17 PROCEDURE — 3075F SYST BP GE 130 - 139MM HG: CPT | Mod: CPTII,S$GLB,, | Performed by: OBSTETRICS & GYNECOLOGY

## 2022-11-17 PROCEDURE — 1159F PR MEDICATION LIST DOCUMENTED IN MEDICAL RECORD: ICD-10-PCS | Mod: CPTII,S$GLB,, | Performed by: OBSTETRICS & GYNECOLOGY

## 2022-11-17 PROCEDURE — 1160F PR REVIEW ALL MEDS BY PRESCRIBER/CLIN PHARMACIST DOCUMENTED: ICD-10-PCS | Mod: CPTII,S$GLB,, | Performed by: OBSTETRICS & GYNECOLOGY

## 2022-11-17 PROCEDURE — 88175 CYTOPATH C/V AUTO FLUID REDO: CPT | Performed by: OBSTETRICS & GYNECOLOGY

## 2022-11-17 PROCEDURE — 1160F RVW MEDS BY RX/DR IN RCRD: CPT | Mod: CPTII,S$GLB,, | Performed by: OBSTETRICS & GYNECOLOGY

## 2022-11-17 RX ORDER — ESTRADIOL 0.1 MG/G
CREAM VAGINAL
Qty: 42.5 G | Refills: 2 | Status: SHIPPED | OUTPATIENT
Start: 2022-11-17 | End: 2022-11-17

## 2022-11-17 RX ORDER — ESTRADIOL 0.1 MG/G
1 CREAM VAGINAL
Qty: 42.5 G | Refills: 2 | Status: SHIPPED | OUTPATIENT
Start: 2022-11-17 | End: 2023-12-18 | Stop reason: SDUPTHER

## 2022-11-17 NOTE — PROGRESS NOTES
GYNECOLOGY OFFICE NOTE    Reason for visit: annual    HPI: Pt is a 62 y.o.  female  who presents for annual. Menarche: 14. Menopausal since late 40s. Denies vaginal bleeding or pelvic pain. Currently using estrace vaginal cream.  She is sexually active. She does not desire STI screening. She denies vaginal discharge.  Last pap: ~3-4 yrs ago, denies hx of abnormal. Last MMG 2022- negative.     History reviewed. No pertinent past medical history.    Past Surgical History:   Procedure Laterality Date    TUBAL LIGATION         Family History   Problem Relation Age of Onset    Colon cancer Father     Breast cancer Maternal Aunt     Colon cancer Maternal Aunt     Ovarian cancer Neg Hx        Social History     Tobacco Use    Smoking status: Never    Smokeless tobacco: Never   Substance Use Topics    Alcohol use: Yes     Comment: rare    Drug use: Never       OB History    Para Term  AB Living   2 2 2     2   SAB IAB Ectopic Multiple Live Births           2      # Outcome Date GA Lbr Lauri/2nd Weight Sex Delivery Anes PTL Lv   2 Term     F Vag-Spont   CURTIS   1 Term     F CS-Classical   CURTIS       Current Outpatient Medications   Medication Sig    DULoxetine (CYMBALTA) 60 MG capsule Take 1 capsule (60 mg total) by mouth once daily.    rosuvastatin (CRESTOR) 10 MG tablet Take 1 tablet (10 mg total) by mouth daily    estradioL (ESTRACE) 0.01 % (0.1 mg/gram) vaginal cream Place 1 g vaginally twice a week.     No current facility-administered medications for this visit.       Allergies: Penicillins and Peanut     /88   Wt 65 kg (143 lb 3.2 oz)   BMI 26.19 kg/m²     ROS:  GENERAL: Denies fever or chills.   SKIN: Denies rash or lesions.   HEAD: Denies head injury or headache.   CHEST: Denies chest pain or shortness of breath.   CARDIOVASCULAR: Denies palpitations or chest pain.   ABDOMEN: No constipation, diarrhea, nausea, vomiting or rectal bleeding.   URINARY: No dysuria, hematuria, or  burning on urination.  REPRODUCTIVE: See HPI.   BREASTS: see HPI  NEUROLOGIC: Denies syncope or weakness.     Physical Exam:  GENERAL: alert, appears stated age and cooperative  NEUROLOGIC: orientated to person, place and time, normal mood and affect   CHEST: Normal respiratory effort  NECK: normal appearance  SKIN: no acne, hirsutism  BREAST EXAM: breasts appear normal, no suspicious masses, no skin or nipple changes or axillary nodes  ABDOMEN: abdomen is soft without significant tenderness, masses  EXTERNAL GENITALIA:  normal general appearance  URETHRA: normal urethra, normal urethral meatus  VAGINA:  normal mucosa, no  lesions  CERVIX:  Normal  UTERUS:  mobile, non tender  ADNEXA: nontender    Diagnosis:  1. Well woman exam with routine gynecological exam    2. Screening for cervical cancer    3. Encounter for screening mammogram for breast cancer        Plan:   1. Annual- refill on estrace  2. Pap  3. MMG ordered for 2023    Orders Placed This Encounter    Mammo Digital Screening Bilat w/ [facility]    Liquid-Based Pap Smear, Screening    estradioL (ESTRACE) 0.01 % (0.1 mg/gram) vaginal cream         Edilma Hicks MD  OB/GYN

## 2022-11-25 LAB
FINAL PATHOLOGIC DIAGNOSIS: NORMAL
Lab: NORMAL

## 2022-12-13 ENCOUNTER — OFFICE VISIT (OUTPATIENT)
Dept: NEUROLOGY | Facility: CLINIC | Age: 62
End: 2022-12-13
Payer: COMMERCIAL

## 2022-12-13 VITALS
HEIGHT: 62 IN | DIASTOLIC BLOOD PRESSURE: 83 MMHG | WEIGHT: 143.31 LBS | SYSTOLIC BLOOD PRESSURE: 111 MMHG | BODY MASS INDEX: 26.37 KG/M2 | HEART RATE: 80 BPM

## 2022-12-13 DIAGNOSIS — G31.84 MILD COGNITIVE IMPAIRMENT WITH MEMORY LOSS: ICD-10-CM

## 2022-12-13 DIAGNOSIS — F41.9 ANXIETY: Primary | ICD-10-CM

## 2022-12-13 DIAGNOSIS — F32.A DEPRESSION, UNSPECIFIED DEPRESSION TYPE: ICD-10-CM

## 2022-12-13 PROCEDURE — 99214 PR OFFICE/OUTPT VISIT, EST, LEVL IV, 30-39 MIN: ICD-10-PCS | Mod: S$GLB,,, | Performed by: PSYCHIATRY & NEUROLOGY

## 2022-12-13 PROCEDURE — 99214 OFFICE O/P EST MOD 30 MIN: CPT | Mod: S$GLB,,, | Performed by: PSYCHIATRY & NEUROLOGY

## 2022-12-13 PROCEDURE — 99999 PR PBB SHADOW E&M-EST. PATIENT-LVL IV: CPT | Mod: PBBFAC,,, | Performed by: PSYCHIATRY & NEUROLOGY

## 2022-12-13 PROCEDURE — 1159F MED LIST DOCD IN RCRD: CPT | Mod: CPTII,S$GLB,, | Performed by: PSYCHIATRY & NEUROLOGY

## 2022-12-13 PROCEDURE — 3074F SYST BP LT 130 MM HG: CPT | Mod: CPTII,S$GLB,, | Performed by: PSYCHIATRY & NEUROLOGY

## 2022-12-13 PROCEDURE — 3079F DIAST BP 80-89 MM HG: CPT | Mod: CPTII,S$GLB,, | Performed by: PSYCHIATRY & NEUROLOGY

## 2022-12-13 PROCEDURE — 3008F PR BODY MASS INDEX (BMI) DOCUMENTED: ICD-10-PCS | Mod: CPTII,S$GLB,, | Performed by: PSYCHIATRY & NEUROLOGY

## 2022-12-13 PROCEDURE — 3008F BODY MASS INDEX DOCD: CPT | Mod: CPTII,S$GLB,, | Performed by: PSYCHIATRY & NEUROLOGY

## 2022-12-13 PROCEDURE — 3079F PR MOST RECENT DIASTOLIC BLOOD PRESSURE 80-89 MM HG: ICD-10-PCS | Mod: CPTII,S$GLB,, | Performed by: PSYCHIATRY & NEUROLOGY

## 2022-12-13 PROCEDURE — 3074F PR MOST RECENT SYSTOLIC BLOOD PRESSURE < 130 MM HG: ICD-10-PCS | Mod: CPTII,S$GLB,, | Performed by: PSYCHIATRY & NEUROLOGY

## 2022-12-13 PROCEDURE — 1160F RVW MEDS BY RX/DR IN RCRD: CPT | Mod: CPTII,S$GLB,, | Performed by: PSYCHIATRY & NEUROLOGY

## 2022-12-13 PROCEDURE — 1159F PR MEDICATION LIST DOCUMENTED IN MEDICAL RECORD: ICD-10-PCS | Mod: CPTII,S$GLB,, | Performed by: PSYCHIATRY & NEUROLOGY

## 2022-12-13 PROCEDURE — 1160F PR REVIEW ALL MEDS BY PRESCRIBER/CLIN PHARMACIST DOCUMENTED: ICD-10-PCS | Mod: CPTII,S$GLB,, | Performed by: PSYCHIATRY & NEUROLOGY

## 2022-12-13 PROCEDURE — 99999 PR PBB SHADOW E&M-EST. PATIENT-LVL IV: ICD-10-PCS | Mod: PBBFAC,,, | Performed by: PSYCHIATRY & NEUROLOGY

## 2022-12-13 RX ORDER — LANOLIN ALCOHOL/MO/W.PET/CERES
100 CREAM (GRAM) TOPICAL DAILY
COMMUNITY
End: 2023-06-12

## 2022-12-13 RX ORDER — DULOXETIN HYDROCHLORIDE 30 MG/1
30 CAPSULE, DELAYED RELEASE ORAL DAILY
Qty: 30 CAPSULE | Refills: 11 | Status: SHIPPED | OUTPATIENT
Start: 2022-12-13 | End: 2023-12-13

## 2022-12-13 RX ORDER — HYDROXYZINE HYDROCHLORIDE 25 MG/1
25 TABLET, FILM COATED ORAL 3 TIMES DAILY PRN
Qty: 60 TABLET | Refills: 2 | Status: SHIPPED | OUTPATIENT
Start: 2022-12-13 | End: 2023-06-12

## 2022-12-13 RX ORDER — IBUPROFEN 100 MG/5ML
1000 SUSPENSION, ORAL (FINAL DOSE FORM) ORAL DAILY
COMMUNITY
End: 2023-06-12

## 2022-12-13 RX ORDER — ACETAMINOPHEN 500 MG
500 TABLET ORAL EVERY 6 HOURS PRN
COMMUNITY
End: 2023-06-12

## 2022-12-13 RX ORDER — ZINC GLUCONATE 50 MG
50 TABLET ORAL DAILY
COMMUNITY
End: 2023-06-12

## 2022-12-13 NOTE — PROGRESS NOTES
[facility] NEUROLOGY  [facility], [place]    Date: 12/13/22  Patient Name: Dot Coleman   MRN: [id number]   PCP: Primary Doctor No  Referring Provider: No ref. provider found    Chief Complaint: follow up for Depression and changes in memory  Subjective:     12/13/2022:  Patient reports that she is been generally stable.  Continues to feel as if she has lingering depression.  Memory changes are described as stable as well but continues to have word-finding and short-term memory disturbance.  Neuropsychology testing in February 2022 did not reveal any significant deficits, but we discussed perhaps following up with our clinic for 1 year evaluation this coming February.  The patient was agreeable.  In the interim, we agreed to continue to pursue better control via medications and lifestyle modification.  She feels as if Cymbalta has not been incredibly helpful.  We discussed potential changes to that regimen which we will pursue alongside acute anxiety medication.  Patient feels as if anxiety is playing a role in her sleep disturbances at night.  She reports waking up 2-4 times per night with racing thoughts and finds it difficult to return to sleep.  No safety concerns related to memory.  Continues to drive with no navigation issues or accidents reported.  Simply feels like she is not sharp.       ============================================  03/17/22:  Patient presents today for follow-up regarding depression and changes in memory.  She shares that she has been tolerating Cymbalta well with no noted side effects.  She continues to misplace keys and phone and gives a couple of examples these extremes.  She has had the chance to meet with neuropsychology recently and complete testing.  Her follow-up with explanation of results is upcoming in the next week.  CT head was completed that showed very mild symmetric diffuse atrophy; reviewed with the patient at the time of the encounter were all  questions and concerns were answered to her satisfaction.  No new medical or neurological complaints otherwise.    No difference with the cymbalta, misplacing keys and phone still, has met with neuropsych,      02/02/22 HPI:   Ms. Dot Coleman is a 62 y.o. female presenting to discuss changes in memory.  She shares that after retired a couple of years ago, she has moved back to the region from [place] where she lived for over 30 years.  After the move, she began to notice issues with her short-term memory.  She gives the example of forgetting her dog outside for a prolonged period of time once.  She typically misplaces small objects such as keys or remote around the house.  She has had at least 1 instance of leaving the stove on unattended.  Overall, cognitive fog is most bothersome.  She notes waking up in the morning and having to focus and struggle to recall what day of the week it is or what she has to do that day.  Picked up a book to read recently and eventually realized that she had read the book just a few months earlier.  Typically cannot remember what she watched on TV the day before.  Daughter has brought up in the last year that she frequently forgets the conversations or information they have discussed.    Patient completes all of her activities of daily living independently including cooking, cleaning, bathing, driving, handling finances.  Denies any accidents driving or major issues navigating.  She does however admit to significant anxiety about driving to or experiencing new places.  She is quite tearful as she describes anxiety leading up to today's visit and driving to our clinic in an area where she is not completely familiar.    Denies noticing any tremors or changes in gait.  No major falls.  Denies a family history of neuro degenerative processes.    Admits to depressed mood.  She tearfully explains that her cognitive fog and anxiety are very abnormal for her.  She is obviously distressed  as she explains being somewhat isolated now as she lives alone.   passed away in 2005 and she raised her daughters as a single parent.  Few hobbies, no changes in appetite, no thoughts of self-harm or harm to others, admits to issues with insomnia over the last several months.    The patient does point out that she feels as if her memory was becoming a problem even before changes in mood.    At the time of today's encounter, the patient presents medical records from outside facility where she had received extensive neurological workup in late 2020. These results are scanned into chart media.  They include routine reversible causes of dementia screening such as B12, TSH and others.  Eeg report reviewed; normal.  These notes referenced a previously conducted MRI brain with evidence of diffuse atrophy but no reports of actual imaging was available for review at the time of today's encounter.  ============================================  CURRENT MEDS:  Current Outpatient Medications   Medication Sig Dispense Refill    acetaminophen (TYLENOL) 500 MG tablet Take 500 mg by mouth every 6 (six) hours as needed for Pain.      ascorbic acid, vitamin C, (VITAMIN C) 1000 MG tablet Take 1,000 mg by mouth once daily.      cyanocobalamin (VITAMIN B-12) 1000 MCG tablet Take 100 mcg by mouth once daily.      estradioL (ESTRACE) 0.01 % (0.1 mg/gram) vaginal cream Place 1 g vaginally twice a week. 42.5 g 2    multivitamin capsule Take 1 capsule by mouth once daily.      rosuvastatin (CRESTOR) 10 MG tablet Take 1 tablet (10 mg total) by mouth daily 90 tablet 1    rosuvastatin (CRESTOR) 10 MG tablet Take 1 tablet (10 mg total) by mouth once daily. 90 tablet 1    zinc gluconate 50 mg tablet Take 50 mg by mouth once daily.      DULoxetine (CYMBALTA) 30 MG capsule Take 1 capsule (30 mg total) by mouth once daily. 30 capsule 11    hydrOXYzine HCL (ATARAX) 25 MG tablet Take 1 tablet (25 mg total) by mouth 3 (three) times daily as  "needed for Anxiety. 60 tablet 2     No current facility-administered medications for this visit.     ALLERGIES:  Review of patient's allergies indicates:   Allergen Reactions    Penicillins Other (See Comments)     Shakes       Peanut Hives     unknown        Objective:     Vitals:    12/13/22 1506   BP: 111/83   BP Location: Left arm   Patient Position: Sitting   Pulse: 80   Weight: 65 kg (143 lb 4.8 oz)   Height: 5' 2" (1.575 m)     General:  Female in NAD, alert and awake, Aox3, well groomed. ?       Neurological Examination.    Mental status: AA&O x3; Affect/mood is euthymic/congruent; no aphasia noted       Cranial Nerves: II-XII grossly intact bilaterally.      Muscle Function:  5/5 throughout    Sensation:  Intact to light touch throughout     Gait: adequate casual gait with stride length and arm swing WNL.     Other:  OCT 2020 EEG: normal (available in media)    02/02/2022 MOCA: 25/30  Visuospatial/Executive 4, Naming 2, Attention 6, Language 3, Abstraction 2, Delayed Recall 2, Orientation 6    02/04/2022 CT head without contrast:  Impression:  Bilateral maxillary sinus mucosal disease.  No acute abnormality."    Assessment:   Dot Coleman is a 62 y.o. female presenting to follow-up for depression and changes in memory.  After extensive discussion, we agreed to reduce Cymbalta back to 30 mg daily with the intent of moving away from this medication over the next month.  We will also initiate hydroxyzine 25 mg up to 3 times daily as needed for acute anxiety.  We plan to touch base via chart message in 2 weeks regarding her progress in this transition and response to the medication.  We will then likely make further adjustments to antidepressant regimen and plan for routine follow-up with neuropsychology around February 2023.    Plan:     Problem List Items Addressed This Visit          Psychiatric    Depression    Relevant Medications    DULoxetine (CYMBALTA) 30 MG capsule     Other Visit Diagnoses       " Anxiety    -  Primary    Relevant Medications    hydrOXYzine HCL (ATARAX) 25 MG tablet    DULoxetine (CYMBALTA) 30 MG capsule    Mild cognitive impairment with memory loss              - reduce Cymbalta to 30 mg daily; will likely discontinue in 2 weeks and transition to a new therapy   - start hydroxyzine 25 mg up to 3 times daily as needed for acute anxiety  - follow-up with our clinic in 3 months or as needed.  - encouraged to participate in cognitively stimulating activities such as regular socialization, puzzles, reading.    I spent a total of 30 minutes on the day of the visit. This includes face to face time and non-face to face time preparing to see the patient (eg, review of tests), obtaining and/or reviewing separately obtained history, documenting clinical information in the electronic or other health record, independently interpreting results and communicating results to the patient/family/caregiver, or care coordinator.    A dictation device was used to produce this document. Use of such devices sometimes results in grammatical errors or replacement of words that sound similarly.    Rodrigo Rivera, DO

## 2022-12-26 ENCOUNTER — PATIENT MESSAGE (OUTPATIENT)
Dept: OBSTETRICS AND GYNECOLOGY | Facility: CLINIC | Age: 62
End: 2022-12-26
Payer: COMMERCIAL

## 2022-12-27 ENCOUNTER — PATIENT MESSAGE (OUTPATIENT)
Dept: NEUROLOGY | Facility: CLINIC | Age: 62
End: 2022-12-27
Payer: COMMERCIAL

## 2022-12-28 ENCOUNTER — TELEPHONE (OUTPATIENT)
Dept: OBSTETRICS AND GYNECOLOGY | Facility: CLINIC | Age: 62
End: 2022-12-28
Payer: COMMERCIAL

## 2022-12-28 NOTE — TELEPHONE ENCOUNTER
----- Message from Norris Cadet sent at 12/28/2022  2:47 PM CST -----  Contact: pt  Type: Requesting to speak with nurse        Who Called: PT  Regarding: would like a call back. Pt states she left a message on [employer] with no response   Would the patient rather a call back or a response via [facility]? Call back  Best Call Back Number: [phone]  Additional Information:

## 2022-12-28 NOTE — TELEPHONE ENCOUNTER
Returned call, informed pt we are just now seeing her message due to we were closed Monday. Informed pt she would need to schedule an appt for an evaluation, pt scheduled for 1/5/23 in [facility] at 0900. Patient verbalized understanding.

## 2023-01-03 ENCOUNTER — TELEPHONE (OUTPATIENT)
Dept: OBSTETRICS AND GYNECOLOGY | Facility: CLINIC | Age: 63
End: 2023-01-03
Payer: COMMERCIAL

## 2023-01-03 NOTE — TELEPHONE ENCOUNTER
Contacted pt and informed Dr. Hicks will be out of the office at the time of her visit on 1/5/23.Patient verbalized understanding. Pt rescheduled for 1315.

## 2023-01-05 ENCOUNTER — OFFICE VISIT (OUTPATIENT)
Dept: OBSTETRICS AND GYNECOLOGY | Facility: CLINIC | Age: 63
End: 2023-01-05
Payer: COMMERCIAL

## 2023-01-05 VITALS
DIASTOLIC BLOOD PRESSURE: 93 MMHG | WEIGHT: 145.75 LBS | BODY MASS INDEX: 26.65 KG/M2 | SYSTOLIC BLOOD PRESSURE: 132 MMHG

## 2023-01-05 DIAGNOSIS — N90.89 VULVAR IRRITATION: Primary | ICD-10-CM

## 2023-01-05 PROCEDURE — 3080F DIAST BP >= 90 MM HG: CPT | Mod: CPTII,S$GLB,, | Performed by: OBSTETRICS & GYNECOLOGY

## 2023-01-05 PROCEDURE — 1160F RVW MEDS BY RX/DR IN RCRD: CPT | Mod: CPTII,S$GLB,, | Performed by: OBSTETRICS & GYNECOLOGY

## 2023-01-05 PROCEDURE — 3075F PR MOST RECENT SYSTOLIC BLOOD PRESS GE 130-139MM HG: ICD-10-PCS | Mod: CPTII,S$GLB,, | Performed by: OBSTETRICS & GYNECOLOGY

## 2023-01-05 PROCEDURE — 1159F MED LIST DOCD IN RCRD: CPT | Mod: CPTII,S$GLB,, | Performed by: OBSTETRICS & GYNECOLOGY

## 2023-01-05 PROCEDURE — 99212 PR OFFICE/OUTPT VISIT, EST, LEVL II, 10-19 MIN: ICD-10-PCS | Mod: S$GLB,,, | Performed by: OBSTETRICS & GYNECOLOGY

## 2023-01-05 PROCEDURE — 3008F BODY MASS INDEX DOCD: CPT | Mod: CPTII,S$GLB,, | Performed by: OBSTETRICS & GYNECOLOGY

## 2023-01-05 PROCEDURE — 1160F PR REVIEW ALL MEDS BY PRESCRIBER/CLIN PHARMACIST DOCUMENTED: ICD-10-PCS | Mod: CPTII,S$GLB,, | Performed by: OBSTETRICS & GYNECOLOGY

## 2023-01-05 PROCEDURE — 99999 PR PBB SHADOW E&M-EST. PATIENT-LVL III: CPT | Mod: PBBFAC,,, | Performed by: OBSTETRICS & GYNECOLOGY

## 2023-01-05 PROCEDURE — 3075F SYST BP GE 130 - 139MM HG: CPT | Mod: CPTII,S$GLB,, | Performed by: OBSTETRICS & GYNECOLOGY

## 2023-01-05 PROCEDURE — 99999 PR PBB SHADOW E&M-EST. PATIENT-LVL III: ICD-10-PCS | Mod: PBBFAC,,, | Performed by: OBSTETRICS & GYNECOLOGY

## 2023-01-05 PROCEDURE — 99212 OFFICE O/P EST SF 10 MIN: CPT | Mod: S$GLB,,, | Performed by: OBSTETRICS & GYNECOLOGY

## 2023-01-05 PROCEDURE — 3008F PR BODY MASS INDEX (BMI) DOCUMENTED: ICD-10-PCS | Mod: CPTII,S$GLB,, | Performed by: OBSTETRICS & GYNECOLOGY

## 2023-01-05 PROCEDURE — 1159F PR MEDICATION LIST DOCUMENTED IN MEDICAL RECORD: ICD-10-PCS | Mod: CPTII,S$GLB,, | Performed by: OBSTETRICS & GYNECOLOGY

## 2023-01-05 PROCEDURE — 3080F PR MOST RECENT DIASTOLIC BLOOD PRESSURE >= 90 MM HG: ICD-10-PCS | Mod: CPTII,S$GLB,, | Performed by: OBSTETRICS & GYNECOLOGY

## 2023-01-05 RX ORDER — CLOTRIMAZOLE AND BETAMETHASONE DIPROPIONATE 10; .64 MG/G; MG/G
CREAM TOPICAL
Qty: 15 G | Refills: 0 | Status: SHIPPED | OUTPATIENT
Start: 2023-01-05 | End: 2023-06-12

## 2023-01-05 NOTE — PROGRESS NOTES
GYNECOLOGY OFFICE NOTE    Reason for visit: vulvar blisters    HPI: Pt is a 62 y.o.  female  who presents for evaluation of vulvar blisters that appeared last week. Had small whiteheads that then popped and glossed over. Now just still having irritation. Symptoms appeared after trying topical estrogen.     History reviewed. No pertinent past medical history.    Past Surgical History:   Procedure Laterality Date    TUBAL LIGATION         Family History   Problem Relation Age of Onset    Colon cancer Father     Breast cancer Maternal Aunt     Colon cancer Maternal Aunt     Ovarian cancer Neg Hx        Social History     Tobacco Use    Smoking status: Never    Smokeless tobacco: Never   Substance Use Topics    Alcohol use: Yes     Comment: rare    Drug use: Never       OB History    Para Term  AB Living   2 2 2     2   SAB IAB Ectopic Multiple Live Births           2      # Outcome Date GA Lbr Lauri/2nd Weight Sex Delivery Anes PTL Lv   2 Term     F Vag-Spont   CURTIS   1 Term     F CS-Classical   CURTIS       Current Outpatient Medications   Medication Sig    acetaminophen (TYLENOL) 500 MG tablet Take 500 mg by mouth every 6 (six) hours as needed for Pain.    ascorbic acid, vitamin C, (VITAMIN C) 1000 MG tablet Take 1,000 mg by mouth once daily.    clotrimazole-betamethasone 1-0.05% (LOTRISONE) cream Apply to affected area 2 times daily    cyanocobalamin (VITAMIN B-12) 1000 MCG tablet Take 100 mcg by mouth once daily.    DULoxetine (CYMBALTA) 30 MG capsule Take 1 capsule (30 mg total) by mouth once daily.    estradioL (ESTRACE) 0.01 % (0.1 mg/gram) vaginal cream Place 1 g vaginally twice a week.    hydrOXYzine HCL (ATARAX) 25 MG tablet Take 1 tablet (25 mg total) by mouth 3 (three) times daily as needed for Anxiety.    multivitamin capsule Take 1 capsule by mouth once daily.    rosuvastatin (CRESTOR) 10 MG tablet Take 1 tablet (10 mg total) by mouth daily    rosuvastatin (CRESTOR) 10 MG  tablet Take 1 tablet (10 mg total) by mouth once daily.    zinc gluconate 50 mg tablet Take 50 mg by mouth once daily.     No current facility-administered medications for this visit.       Allergies: Penicillins and Peanut     BP (!) 132/93   Wt 66.1 kg (145 lb 11.6 oz)   BMI 26.65 kg/m²     ROS:  GENERAL: Denies fever or chills.   SKIN: Denies rash or lesions.   HEAD: Denies head injury or headache.   CHEST: Denies chest pain or shortness of breath.   CARDIOVASCULAR: Denies palpitations or chest pain.   ABDOMEN: No constipation, diarrhea, nausea, vomiting or rectal bleeding.   URINARY: No dysuria, hematuria, or burning on urination.  REPRODUCTIVE: See HPI.   BREASTS: see HPI  NEUROLOGIC: Denies syncope or weakness.     Physical Exam:  GENERAL: alert, appears stated age and cooperative  NEUROLOGIC: orientated to person, place and time, normal mood and affect   CHEST: Normal respiratory effort  NECK: normal appearance  SKIN: no acne, hirsutism  BREAST EXAM: not examined  ABDOMEN: abdomen is soft without significant tenderness, masses  EXTERNAL GENITALIA:  normal general appearance  URETHRA: normal urethra, normal urethral meatus  VAGINA:  atrophic, no  lesions    Diagnosis:  1. Vulvar irritation        Plan:   1. No current lesion. If they recur- will need to be added on same day for evaluation. Rx sent to help with acute issues    Orders Placed This Encounter    clotrimazole-betamethasone 1-0.05% (LOTRISONE) cream           Face to Face time with patient: 15 minutes of total time spent on the encounter, which includes face to face time and non-face to face time preparing to see the patient (eg, review of tests), Obtaining and/or reviewing separately obtained history, Documenting clinical information in the electronic or other health record, Independently interpreting results (not separately reported) and communicating results to the patient/family/caregiver, or Care coordination (not separately reported).          Edilma Hicks MD  OB/GYN

## 2023-04-11 ENCOUNTER — PATIENT MESSAGE (OUTPATIENT)
Dept: RESEARCH | Facility: HOSPITAL | Age: 63
End: 2023-04-11
Payer: COMMERCIAL

## 2023-06-01 ENCOUNTER — PATIENT MESSAGE (OUTPATIENT)
Dept: OBSTETRICS AND GYNECOLOGY | Facility: CLINIC | Age: 63
End: 2023-06-01
Payer: COMMERCIAL

## 2023-06-12 ENCOUNTER — PATIENT OUTREACH (OUTPATIENT)
Dept: ADMINISTRATIVE | Facility: HOSPITAL | Age: 63
End: 2023-06-12
Payer: COMMERCIAL

## 2023-06-12 ENCOUNTER — OFFICE VISIT (OUTPATIENT)
Dept: FAMILY MEDICINE | Facility: CLINIC | Age: 63
End: 2023-06-12
Payer: COMMERCIAL

## 2023-06-12 ENCOUNTER — PATIENT MESSAGE (OUTPATIENT)
Dept: FAMILY MEDICINE | Facility: CLINIC | Age: 63
End: 2023-06-12

## 2023-06-12 VITALS
SYSTOLIC BLOOD PRESSURE: 126 MMHG | DIASTOLIC BLOOD PRESSURE: 74 MMHG | BODY MASS INDEX: 26.87 KG/M2 | OXYGEN SATURATION: 96 % | WEIGHT: 142.31 LBS | HEIGHT: 61 IN | HEART RATE: 75 BPM

## 2023-06-12 DIAGNOSIS — L20.89 OTHER ATOPIC DERMATITIS: ICD-10-CM

## 2023-06-12 DIAGNOSIS — Z23 NEED FOR DIPHTHERIA-TETANUS-PERTUSSIS (TDAP) VACCINE: ICD-10-CM

## 2023-06-12 DIAGNOSIS — Z00.00 ANNUAL PHYSICAL EXAM: Primary | ICD-10-CM

## 2023-06-12 DIAGNOSIS — R79.89 LOW VITAMIN D LEVEL: ICD-10-CM

## 2023-06-12 DIAGNOSIS — R00.9 ABNORMALITY OF HEART BEAT: ICD-10-CM

## 2023-06-12 DIAGNOSIS — Z11.59 NEED FOR HEPATITIS C SCREENING TEST: ICD-10-CM

## 2023-06-12 DIAGNOSIS — H61.22 LEFT EAR IMPACTED CERUMEN: ICD-10-CM

## 2023-06-12 DIAGNOSIS — Z11.4 ENCOUNTER FOR SCREENING FOR HIV: ICD-10-CM

## 2023-06-12 DIAGNOSIS — Z13.228 ENCOUNTER FOR SCREENING FOR METABOLIC DISORDER: ICD-10-CM

## 2023-06-12 DIAGNOSIS — Z23 NEED FOR SHINGLES VACCINE: ICD-10-CM

## 2023-06-12 DIAGNOSIS — Z87.81 HISTORY OF FRACTURE: ICD-10-CM

## 2023-06-12 PROCEDURE — 90471 TDAP VACCINE GREATER THAN OR EQUAL TO 7YO IM: ICD-10-PCS | Mod: S$GLB,,, | Performed by: STUDENT IN AN ORGANIZED HEALTH CARE EDUCATION/TRAINING PROGRAM

## 2023-06-12 PROCEDURE — 99386 PR PREVENTIVE VISIT,NEW,40-64: ICD-10-PCS | Mod: 25,S$GLB,, | Performed by: STUDENT IN AN ORGANIZED HEALTH CARE EDUCATION/TRAINING PROGRAM

## 2023-06-12 PROCEDURE — 90471 IMMUNIZATION ADMIN: CPT | Mod: S$GLB,,, | Performed by: STUDENT IN AN ORGANIZED HEALTH CARE EDUCATION/TRAINING PROGRAM

## 2023-06-12 PROCEDURE — 99386 PREV VISIT NEW AGE 40-64: CPT | Mod: 25,S$GLB,, | Performed by: STUDENT IN AN ORGANIZED HEALTH CARE EDUCATION/TRAINING PROGRAM

## 2023-06-12 PROCEDURE — 3008F PR BODY MASS INDEX (BMI) DOCUMENTED: ICD-10-PCS | Mod: CPTII,S$GLB,, | Performed by: STUDENT IN AN ORGANIZED HEALTH CARE EDUCATION/TRAINING PROGRAM

## 2023-06-12 PROCEDURE — 1159F PR MEDICATION LIST DOCUMENTED IN MEDICAL RECORD: ICD-10-PCS | Mod: CPTII,S$GLB,, | Performed by: STUDENT IN AN ORGANIZED HEALTH CARE EDUCATION/TRAINING PROGRAM

## 2023-06-12 PROCEDURE — 1160F PR REVIEW ALL MEDS BY PRESCRIBER/CLIN PHARMACIST DOCUMENTED: ICD-10-PCS | Mod: CPTII,S$GLB,, | Performed by: STUDENT IN AN ORGANIZED HEALTH CARE EDUCATION/TRAINING PROGRAM

## 2023-06-12 PROCEDURE — 90715 TDAP VACCINE GREATER THAN OR EQUAL TO 7YO IM: ICD-10-PCS | Mod: S$GLB,,, | Performed by: STUDENT IN AN ORGANIZED HEALTH CARE EDUCATION/TRAINING PROGRAM

## 2023-06-12 PROCEDURE — 3008F BODY MASS INDEX DOCD: CPT | Mod: CPTII,S$GLB,, | Performed by: STUDENT IN AN ORGANIZED HEALTH CARE EDUCATION/TRAINING PROGRAM

## 2023-06-12 PROCEDURE — 3074F PR MOST RECENT SYSTOLIC BLOOD PRESSURE < 130 MM HG: ICD-10-PCS | Mod: CPTII,S$GLB,, | Performed by: STUDENT IN AN ORGANIZED HEALTH CARE EDUCATION/TRAINING PROGRAM

## 2023-06-12 PROCEDURE — 3074F SYST BP LT 130 MM HG: CPT | Mod: CPTII,S$GLB,, | Performed by: STUDENT IN AN ORGANIZED HEALTH CARE EDUCATION/TRAINING PROGRAM

## 2023-06-12 PROCEDURE — 1160F RVW MEDS BY RX/DR IN RCRD: CPT | Mod: CPTII,S$GLB,, | Performed by: STUDENT IN AN ORGANIZED HEALTH CARE EDUCATION/TRAINING PROGRAM

## 2023-06-12 PROCEDURE — 1159F MED LIST DOCD IN RCRD: CPT | Mod: CPTII,S$GLB,, | Performed by: STUDENT IN AN ORGANIZED HEALTH CARE EDUCATION/TRAINING PROGRAM

## 2023-06-12 PROCEDURE — 99999 PR PBB SHADOW E&M-EST. PATIENT-LVL IV: CPT | Mod: PBBFAC,,, | Performed by: STUDENT IN AN ORGANIZED HEALTH CARE EDUCATION/TRAINING PROGRAM

## 2023-06-12 PROCEDURE — 3078F PR MOST RECENT DIASTOLIC BLOOD PRESSURE < 80 MM HG: ICD-10-PCS | Mod: CPTII,S$GLB,, | Performed by: STUDENT IN AN ORGANIZED HEALTH CARE EDUCATION/TRAINING PROGRAM

## 2023-06-12 PROCEDURE — 3078F DIAST BP <80 MM HG: CPT | Mod: CPTII,S$GLB,, | Performed by: STUDENT IN AN ORGANIZED HEALTH CARE EDUCATION/TRAINING PROGRAM

## 2023-06-12 PROCEDURE — 99999 PR PBB SHADOW E&M-EST. PATIENT-LVL IV: ICD-10-PCS | Mod: PBBFAC,,, | Performed by: STUDENT IN AN ORGANIZED HEALTH CARE EDUCATION/TRAINING PROGRAM

## 2023-06-12 PROCEDURE — 90715 TDAP VACCINE 7 YRS/> IM: CPT | Mod: S$GLB,,, | Performed by: STUDENT IN AN ORGANIZED HEALTH CARE EDUCATION/TRAINING PROGRAM

## 2023-06-12 RX ORDER — TRIAMCINOLONE ACETONIDE 1 MG/G
OINTMENT TOPICAL 2 TIMES DAILY
Qty: 80 G | Refills: 0 | Status: SHIPPED | OUTPATIENT
Start: 2023-06-12

## 2023-06-12 RX ORDER — ZOSTER VACCINE RECOMBINANT, ADJUVANTED 50 MCG/0.5
KIT INTRAMUSCULAR
Qty: 1 EACH | Refills: 1 | Status: SHIPPED | OUTPATIENT
Start: 2023-06-12

## 2023-06-12 NOTE — PROGRESS NOTES
[facility] Note    Chief Complaint      Chief Complaint   Patient presents with    Establish Care     History of Present Illness      HPI    Dot Coleman is a 62 y.o. female with HLD, h/o knee cap fracture (from the ground level (2 years ago) , PMS and mild depression who presents for establishment of care and annual physical. She endorses mild nasal congestion and left ear fullness, no cough, sore throat, chest pain, no fever, chills, leg swelling. She also c/o intermittent itchy rash on her upper back. She is compliant with her current regimen and described her mood to be great today.    Problem List Addressed This Visit:  1. Annual physical exam  -     Lipid Panel; Future; Expected date: 06/12/2023  -     TSH; Future; Expected date: 06/12/2023  -     Hemoglobin A1C; Future; Expected date: 06/12/2023  -     Comprehensive Metabolic Panel; Future; Expected date: 06/12/2023  -     CBC Auto Differential; Future; Expected date: 06/12/2023  -     SCHEDULED EKG 12-LEAD (to [facility]); Future    2. Need for hepatitis C screening test  -     Hepatitis C Antibody; Future; Expected date: 06/12/2023    3. Encounter for screening for HIV  -     HIV 1/2 Ag/Ab (4th Gen); Future; Expected date: 06/12/2023    4. Encounter for screening for metabolic disorder    5. Low vitamin D level  -     Vitamin D; Future; Expected date: 06/12/2023    6. History of fracture  -     DXA Bone Density Axial Skeleton 1 or more sites; Future; Expected date: 06/12/2023    7. Need for diphtheria-tetanus-pertussis (Tdap) vaccine  -     (In Office Administered) Tdap Vaccine    8. Need for shingles vaccine  -     varicella-zoster gE-AS01B, PF, (SHINGRIX, PF,) 50 mcg/0.5 mL injection; Inject 0.5mL IM at 0 and 2-6 months  Dispense: 1 each; Refill: 1    9. Left ear impacted cerumen  -     carbamide peroxide (DEBROX) 6.5 % otic solution; Place 5 drops into the left ear 2 (two) times daily.  Dispense: 18 mL; Refill: 0    10. Other atopic  dermatitis  -     triamcinolone acetonide 0.1% (KENALOG) 0.1 % ointment; Apply topically 2 (two) times daily.  Dispense: 80 g; Refill: 0    11. Abnormality of heart beat  -     SCHEDULED EKG 12-LEAD (to [place]); Future         Health Maintenance   Topic Date Due    Hepatitis C Screening  Never done    Mammogram  01/12/2024    Lipid Panel  04/18/2027    TETANUS VACCINE  06/12/2033       No past medical history on file.    Past Surgical History:   Procedure Laterality Date    BREAST CYST ASPIRATION      TUBAL LIGATION         family history includes Breast cancer in her maternal aunt; Colon cancer in her father and maternal aunt.    Social History     Tobacco Use    Smoking status: Never    Smokeless tobacco: Never   Substance Use Topics    Alcohol use: Yes     Comment: rare    Drug use: Never       Review of Systems   Constitutional:  Negative for fatigue and fever.   HENT:  Positive for congestion.    Respiratory:  Negative for cough and chest tightness.    Gastrointestinal:  Negative for abdominal pain, diarrhea and vomiting.   Endocrine: Negative for polydipsia and polyphagia.   Genitourinary:  Negative for difficulty urinating, dysuria and frequency.   Musculoskeletal:  Negative for arthralgias, back pain, gait problem and joint swelling.   Skin:  Positive for rash (upper back).   Neurological:  Negative for seizures, weakness, numbness and headaches.   Psychiatric/Behavioral:  Negative for sleep disturbance.      Outpatient Encounter Medications as of 6/12/2023   Medication Sig Dispense Refill    DULoxetine (CYMBALTA) 30 MG capsule Take 1 capsule (30 mg total) by mouth once daily. 30 capsule 11    estradioL (ESTRACE) 0.01 % (0.1 mg/gram) vaginal cream Place 1 g vaginally twice a week. 42.5 g 2    rosuvastatin (CRESTOR) 10 MG tablet Take 1 tablet (10 mg total) by mouth once daily. 90 tablet 1    carbamide peroxide (DEBROX) 6.5 % otic solution Place 5 drops into the left ear 2 (two) times daily. 18 mL 0     "triamcinolone acetonide 0.1% (KENALOG) 0.1 % ointment Apply topically 2 (two) times daily. 80 g 0    varicella-zoster gE-AS01B, PF, (SHINGRIX, PF,) 50 mcg/0.5 mL injection Inject 0.5mL IM at 0 and 2-6 months 1 each 1    [DISCONTINUED] acetaminophen (TYLENOL) 500 MG tablet Take 500 mg by mouth every 6 (six) hours as needed for Pain.      [DISCONTINUED] ascorbic acid, vitamin C, (VITAMIN C) 1000 MG tablet Take 1,000 mg by mouth once daily.      [DISCONTINUED] clotrimazole-betamethasone 1-0.05% (LOTRISONE) cream Apply to affected area 2 times daily. (Patient not taking: Reported on 6/12/2023) 15 g 0    [DISCONTINUED] cyanocobalamin (VITAMIN B-12) 1000 MCG tablet Take 100 mcg by mouth once daily.      [DISCONTINUED] hydrOXYzine HCL (ATARAX) 25 MG tablet Take 1 tablet (25 mg total) by mouth 3 (three) times daily as needed for Anxiety. (Patient not taking: Reported on 6/12/2023) 60 tablet 2    [DISCONTINUED] multivitamin capsule Take 1 capsule by mouth once daily.      [DISCONTINUED] rosuvastatin (CRESTOR) 10 MG tablet Take 1 tablet (10 mg total) by mouth daily 90 tablet 1    [DISCONTINUED] zinc gluconate 50 mg tablet Take 50 mg by mouth once daily.       No facility-administered encounter medications on file as of 6/12/2023.        Review of patient's allergies indicates:   Allergen Reactions    Penicillins Other (See Comments)     Shakes       Peanut Hives     unknown       Physical Exam      Vital Signs  Pulse: 75  SpO2: 96 %  BP: 126/74  Pain Score: 0-No pain  Height and Weight  Height: 5' 1" (154.9 cm)  Weight: 64.5 kg (142 lb 4.8 oz)  BSA (Calculated - sq m): 1.67 sq meters  BMI (Calculated): 26.9  Weight in (lb) to have BMI = 25: 132]    Physical Exam  Constitutional:       Appearance: Normal appearance.   HENT:      Head: Normocephalic and atraumatic.      Right Ear: Tympanic membrane normal.      Left Ear: Tympanic membrane normal. There is impacted cerumen.      Mouth/Throat:      Mouth: Mucous membranes are " moist.      Pharynx: Oropharynx is clear.   Eyes:      Extraocular Movements: Extraocular movements intact.      Conjunctiva/sclera: Conjunctivae normal.      Pupils: Pupils are equal, round, and reactive to light.   Cardiovascular:      Rate and Rhythm: Normal rate and regular rhythm.      Pulses: Normal pulses.      Heart sounds: Normal heart sounds.   Pulmonary:      Effort: Pulmonary effort is normal.      Breath sounds: Normal breath sounds.   Abdominal:      General: Abdomen is flat. Bowel sounds are normal.      Palpations: Abdomen is soft.   Musculoskeletal:      Cervical back: Normal range of motion.      Right lower leg: No edema.      Left lower leg: No edema.   Skin:     General: Skin is warm and dry.      Findings: Rash: hyperpigmented scaly rash on upper left back.   Neurological:      General: No focal deficit present.      Mental Status: She is alert and oriented to person, place, and time.      Sensory: No sensory deficit.   Psychiatric:         Mood and Affect: Mood normal.         Behavior: Behavior normal.        Laboratory:  CBC:  No results for input(s): WBC, RBC, HGB, HCT, PLT, MCV, MCH, MCHC in the last 2160 hours.  CMP:  No results for input(s): GLU, CALCIUM, ALBUMIN, PROT, NA, K, CO2, CL, BUN, ALKPHOS, ALT, AST, BILITOT in the last 2160 hours.    Invalid input(s): CREATININ  URINALYSIS:  No results for input(s): COLORU, CLARITYU, SPECGRAV, PHUR, PROTEINUA, GLUCOSEU, BILIRUBINCON, BLOODU, WBCU, RBCU, BACTERIA, MUCUS, NITRITE, LEUKOCYTESUR, UROBILINOGEN, HYALINECASTS in the last 2160 hours.   LIPIDS:  No results for input(s): TSH, HDL, CHOL, TRIG, LDLCALC, CHOLHDL, NONHDLCHOL, TOTALCHOLEST in the last 2160 hours.  TSH:  No results for input(s): TSH in the last 2160 hours.  A1C:  No results for input(s): HGBA1C in the last 2160 hours.    Radiology:      Assessment/Plan     Dot Coleman is a 62 y.o.female with:    1. Annual physical exam  -preventive counseling provided  -UTD on mammogram  and pap smear  -vaccines due : shingles, tDAp and COVID booster  - Lipid Panel; Future  - TSH; Future  - Hemoglobin A1C; Future  - Comprehensive Metabolic Panel; Future  - CBC Auto Differential; Future  - SCHEDULED EKG 12-LEAD (to [facility]); Future    2. Need for hepatitis C screening test  - Hepatitis C Antibody; Future    3. Encounter for screening for HIV  - HIV 1/2 Ag/Ab (4th Gen); Future    4. Encounter for screening for metabolic disorder    5. Low vitamin D level  -h/o low vitamin d level, currently not on supplement  - check Vitamin D; Future    6. History of fracture  -h/o knee cap fracture 2 years ago  - DXA Bone Density Axial Skeleton 1 or more sites; Future    7. Need for diphtheria-tetanus-pertussis (Tdap) vaccine  - (In Office Administered) Tdap Vaccine    8. Need for shingles vaccine  - varicella-zoster gE-AS01B, PF, (SHINGRIX, PF,) 50 mcg/0.5 mL injection; Inject 0.5mL IM at 0 and 2-6 months  Dispense: 1 each; Refill: 1    9. Left ear impacted cerumen  - carbamide peroxide (DEBROX) 6.5 % otic solution; Place 5 drops into the left ear 2 (two) times daily.  Dispense: 18 mL; Refill: 0  -will do ear irrigation if no improvement    10. Other atopic dermatitis  -uncontrolled  - start triamcinolone acetonide 0.1% (KENALOG) 0.1 % ointment; Apply topically 2 (two) times daily.  Dispense: 80 g; Refill: 0    11. Abnormality of heart beat  - SCHEDULED EKG 12-LEAD (to [facility]); Future    12. H/o mild depression  -well controlled on current regimen      -Continue current medications and maintain follow up with specialists.      Patient verbalizes understanding and agrees with current treatment plan.      Sosa Carlos MD  [facility]

## 2023-06-14 DIAGNOSIS — R76.8 POSITIVE HEPATITIS C ANTIBODY TEST: Primary | ICD-10-CM

## 2023-06-15 ENCOUNTER — PATIENT MESSAGE (OUTPATIENT)
Dept: FAMILY MEDICINE | Facility: CLINIC | Age: 63
End: 2023-06-15
Payer: COMMERCIAL

## 2023-06-20 ENCOUNTER — PATIENT OUTREACH (OUTPATIENT)
Dept: ADMINISTRATIVE | Facility: HOSPITAL | Age: 63
End: 2023-06-20
Payer: COMMERCIAL

## 2023-06-26 ENCOUNTER — PATIENT MESSAGE (OUTPATIENT)
Dept: FAMILY MEDICINE | Facility: CLINIC | Age: 63
End: 2023-06-26
Payer: COMMERCIAL

## 2023-06-27 ENCOUNTER — OFFICE VISIT (OUTPATIENT)
Dept: FAMILY MEDICINE | Facility: CLINIC | Age: 63
End: 2023-06-27
Payer: COMMERCIAL

## 2023-06-27 VITALS
SYSTOLIC BLOOD PRESSURE: 114 MMHG | HEIGHT: 61 IN | DIASTOLIC BLOOD PRESSURE: 66 MMHG | HEART RATE: 78 BPM | BODY MASS INDEX: 26.91 KG/M2 | WEIGHT: 142.5 LBS | OXYGEN SATURATION: 95 %

## 2023-06-27 DIAGNOSIS — R09.81 CHRONIC NASAL CONGESTION: ICD-10-CM

## 2023-06-27 DIAGNOSIS — H61.22 LEFT EAR IMPACTED CERUMEN: ICD-10-CM

## 2023-06-27 DIAGNOSIS — E78.49 OTHER HYPERLIPIDEMIA: ICD-10-CM

## 2023-06-27 PROCEDURE — 1160F RVW MEDS BY RX/DR IN RCRD: CPT | Mod: CPTII,S$GLB,, | Performed by: STUDENT IN AN ORGANIZED HEALTH CARE EDUCATION/TRAINING PROGRAM

## 2023-06-27 PROCEDURE — 1159F MED LIST DOCD IN RCRD: CPT | Mod: CPTII,S$GLB,, | Performed by: STUDENT IN AN ORGANIZED HEALTH CARE EDUCATION/TRAINING PROGRAM

## 2023-06-27 PROCEDURE — 3008F BODY MASS INDEX DOCD: CPT | Mod: CPTII,S$GLB,, | Performed by: STUDENT IN AN ORGANIZED HEALTH CARE EDUCATION/TRAINING PROGRAM

## 2023-06-27 PROCEDURE — 99999 PR PBB SHADOW E&M-EST. PATIENT-LVL III: ICD-10-PCS | Mod: PBBFAC,,, | Performed by: STUDENT IN AN ORGANIZED HEALTH CARE EDUCATION/TRAINING PROGRAM

## 2023-06-27 PROCEDURE — 99213 OFFICE O/P EST LOW 20 MIN: CPT | Mod: S$GLB,,, | Performed by: STUDENT IN AN ORGANIZED HEALTH CARE EDUCATION/TRAINING PROGRAM

## 2023-06-27 PROCEDURE — 3044F HG A1C LEVEL LT 7.0%: CPT | Mod: CPTII,S$GLB,, | Performed by: STUDENT IN AN ORGANIZED HEALTH CARE EDUCATION/TRAINING PROGRAM

## 2023-06-27 PROCEDURE — 3044F PR MOST RECENT HEMOGLOBIN A1C LEVEL <7.0%: ICD-10-PCS | Mod: CPTII,S$GLB,, | Performed by: STUDENT IN AN ORGANIZED HEALTH CARE EDUCATION/TRAINING PROGRAM

## 2023-06-27 PROCEDURE — 1160F PR REVIEW ALL MEDS BY PRESCRIBER/CLIN PHARMACIST DOCUMENTED: ICD-10-PCS | Mod: CPTII,S$GLB,, | Performed by: STUDENT IN AN ORGANIZED HEALTH CARE EDUCATION/TRAINING PROGRAM

## 2023-06-27 PROCEDURE — 3074F SYST BP LT 130 MM HG: CPT | Mod: CPTII,S$GLB,, | Performed by: STUDENT IN AN ORGANIZED HEALTH CARE EDUCATION/TRAINING PROGRAM

## 2023-06-27 PROCEDURE — 99999 PR PBB SHADOW E&M-EST. PATIENT-LVL III: CPT | Mod: PBBFAC,,, | Performed by: STUDENT IN AN ORGANIZED HEALTH CARE EDUCATION/TRAINING PROGRAM

## 2023-06-27 PROCEDURE — 3008F PR BODY MASS INDEX (BMI) DOCUMENTED: ICD-10-PCS | Mod: CPTII,S$GLB,, | Performed by: STUDENT IN AN ORGANIZED HEALTH CARE EDUCATION/TRAINING PROGRAM

## 2023-06-27 PROCEDURE — 3078F DIAST BP <80 MM HG: CPT | Mod: CPTII,S$GLB,, | Performed by: STUDENT IN AN ORGANIZED HEALTH CARE EDUCATION/TRAINING PROGRAM

## 2023-06-27 PROCEDURE — 3078F PR MOST RECENT DIASTOLIC BLOOD PRESSURE < 80 MM HG: ICD-10-PCS | Mod: CPTII,S$GLB,, | Performed by: STUDENT IN AN ORGANIZED HEALTH CARE EDUCATION/TRAINING PROGRAM

## 2023-06-27 PROCEDURE — 3074F PR MOST RECENT SYSTOLIC BLOOD PRESSURE < 130 MM HG: ICD-10-PCS | Mod: CPTII,S$GLB,, | Performed by: STUDENT IN AN ORGANIZED HEALTH CARE EDUCATION/TRAINING PROGRAM

## 2023-06-27 PROCEDURE — 99213 PR OFFICE/OUTPT VISIT, EST, LEVL III, 20-29 MIN: ICD-10-PCS | Mod: S$GLB,,, | Performed by: STUDENT IN AN ORGANIZED HEALTH CARE EDUCATION/TRAINING PROGRAM

## 2023-06-27 PROCEDURE — 1159F PR MEDICATION LIST DOCUMENTED IN MEDICAL RECORD: ICD-10-PCS | Mod: CPTII,S$GLB,, | Performed by: STUDENT IN AN ORGANIZED HEALTH CARE EDUCATION/TRAINING PROGRAM

## 2023-06-27 RX ORDER — ROSUVASTATIN CALCIUM 10 MG/1
10 TABLET, COATED ORAL DAILY
Qty: 90 TABLET | Refills: 3 | Status: SHIPPED | OUTPATIENT
Start: 2023-06-27

## 2023-06-27 RX ORDER — IPRATROPIUM BROMIDE 21 UG/1
2 SPRAY, METERED NASAL 2 TIMES DAILY
Qty: 30 ML | Refills: 3 | Status: SHIPPED | OUTPATIENT
Start: 2023-06-27 | End: 2023-10-04

## 2023-06-27 NOTE — PROGRESS NOTES
[facility] Note    Chief Complaint      Chief Complaint   Patient presents with    Follow-up    Otalgia     History of Present Illness     Dot Coleman is a 62 y.o. female with HLD, h/o knee cap fracture (from the ground level (2 years ago) , PMS and mild depression who presents with complaints of left ear fullness s/p debrox otic with  no relief. Still with nasal congestion, not yet using claritin. No other complaints.     Problem List Addressed This Visit:    1. Left ear impacted cerumen  -     Ear wax removal    2. Chronic nasal congestion  -     ipratropium (ATROVENT) 21 mcg (0.03 %) nasal spray; 2 sprays by Each Nostril route 2 (two) times daily.  Dispense: 30 mL; Refill: 3    3. Other hyperlipidemia  -     rosuvastatin (CRESTOR) 10 MG tablet; Take 1 tablet (10 mg total) by mouth once daily.  Dispense: 90 tablet; Refill: 3         Health Maintenance   Topic Date Due    Mammogram  01/12/2024    Lipid Panel  06/14/2028    TETANUS VACCINE  06/12/2033    Hepatitis C Screening  Completed       History reviewed. No pertinent past medical history.    Past Surgical History:   Procedure Laterality Date    BREAST CYST ASPIRATION      TUBAL LIGATION         family history includes Breast cancer in her maternal aunt; Colon cancer in her father and maternal aunt.    Social History     Tobacco Use    Smoking status: Never    Smokeless tobacco: Never   Substance Use Topics    Alcohol use: Yes     Comment: rare    Drug use: Never       Review of Systems   Constitutional:  Negative for fatigue and fever.   HENT:  Positive for congestion and ear pain.    Respiratory:  Negative for cough and chest tightness.    Gastrointestinal:  Negative for abdominal pain, diarrhea and vomiting.   Endocrine: Negative for polydipsia and polyphagia.   Genitourinary:  Negative for difficulty urinating, dysuria and frequency.   Musculoskeletal:  Negative for arthralgias, back pain, gait problem and joint swelling.   Skin:  " Positive for rash (upper back).   Neurological:  Negative for seizures, weakness, numbness and headaches.   Psychiatric/Behavioral:  Negative for sleep disturbance.      Outpatient Encounter Medications as of 6/27/2023   Medication Sig Dispense Refill    carbamide peroxide (DEBROX) 6.5 % otic solution Place 5 drops into the left ear 2 (two) times daily. 18 mL 0    DULoxetine (CYMBALTA) 30 MG capsule Take 1 capsule (30 mg total) by mouth once daily. 30 capsule 11    estradioL (ESTRACE) 0.01 % (0.1 mg/gram) vaginal cream Place 1 g vaginally twice a week. 42.5 g 2    triamcinolone acetonide 0.1% (KENALOG) 0.1 % ointment Apply topically 2 (two) times daily. 80 g 0    varicella-zoster gE-AS01B, PF, (SHINGRIX, PF,) 50 mcg/0.5 mL injection Inject 0.5mL IM at 0 and 2-6 months 1 each 1    [DISCONTINUED] rosuvastatin (CRESTOR) 10 MG tablet Take 1 tablet (10 mg total) by mouth once daily. 90 tablet 1    ipratropium (ATROVENT) 21 mcg (0.03 %) nasal spray 2 sprays by Each Nostril route 2 (two) times daily. 30 mL 3    rosuvastatin (CRESTOR) 10 MG tablet Take 1 tablet (10 mg total) by mouth once daily. 90 tablet 3     No facility-administered encounter medications on file as of 6/27/2023.        Review of patient's allergies indicates:   Allergen Reactions    Penicillins Other (See Comments)     Shakes       Peanut Hives     unknown       Physical Exam      Vital Signs  Pulse: 78  SpO2: 95 %  BP: 114/66  BP Location: Left arm  Patient Position: Sitting  Pain Score: 0-No pain  Height and Weight  Height: 5' 1" (154.9 cm)  Weight: 64.6 kg (142 lb 8 oz)  BSA (Calculated - sq m): 1.67 sq meters  BMI (Calculated): 26.9  Weight in (lb) to have BMI = 25: 132]    Physical Exam  Constitutional:       Appearance: Normal appearance.   HENT:      Head: Normocephalic and atraumatic.      Right Ear: Tympanic membrane normal.      Left Ear: Tympanic membrane normal. There is impacted cerumen.      Mouth/Throat:      Mouth: Mucous membranes are " moist.      Pharynx: Oropharynx is clear.   Eyes:      Extraocular Movements: Extraocular movements intact.      Conjunctiva/sclera: Conjunctivae normal.      Pupils: Pupils are equal, round, and reactive to light.   Cardiovascular:      Rate and Rhythm: Normal rate and regular rhythm.      Pulses: Normal pulses.      Heart sounds: Normal heart sounds.   Pulmonary:      Effort: Pulmonary effort is normal.      Breath sounds: Normal breath sounds.   Abdominal:      General: Abdomen is flat. Bowel sounds are normal.      Palpations: Abdomen is soft.   Musculoskeletal:      Cervical back: Normal range of motion.      Right lower leg: No edema.      Left lower leg: No edema.   Skin:     General: Skin is warm and dry.      Findings: Rash: hyperpigmented scaly rash on upper left back.   Neurological:      General: No focal deficit present.      Mental Status: She is alert and oriented to person, place, and time.      Sensory: No sensory deficit.   Psychiatric:         Mood and Affect: Mood normal.         Behavior: Behavior normal.        Laboratory:  CBC:  Recent Labs   Lab Result Units 06/14/23  0706   WBC K/uL 6.01   RBC M/uL 4.33   Hemoglobin g/dL 13.7   Hematocrit % 41.5   Platelets K/uL 177   MCV fL 96   MCH pg 31.6*   MCHC g/dL 33.0     CMP:  Recent Labs   Lab Result Units 06/14/23  0706   Glucose mg/dL 84   Calcium mg/dL 9.5   Albumin g/dL 4.4   Total Protein g/dL 7.3   Sodium mmol/L 139   Potassium mmol/L 4.1   CO2 mmol/L 27   Chloride mmol/L 103   BUN mg/dL 15   Alkaline Phosphatase U/L 77   ALT U/L 23   AST U/L 32   Total Bilirubin mg/dL 0.8     URINALYSIS:  No results for input(s): COLORU, CLARITYU, SPECGRAV, PHUR, PROTEINUA, GLUCOSEU, BILIRUBINCON, BLOODU, WBCU, RBCU, BACTERIA, MUCUS, NITRITE, LEUKOCYTESUR, UROBILINOGEN, HYALINECASTS in the last 2160 hours.   LIPIDS:  Recent Labs   Lab Result Units 06/14/23  0706   TSH uIU/mL 1.520   HDL mg/dL 73   Cholesterol mg/dL 183   Triglycerides mg/dL 55   LDL  Cholesterol mg/dL 99.0   HDL/Cholesterol Ratio % 39.9   Non-HDL Cholesterol mg/dL 110   Total Cholesterol/HDL Ratio  2.5     TSH:  Recent Labs   Lab Result Units 06/14/23  0706   TSH uIU/mL 1.520     A1C:  Recent Labs   Lab Result Units 06/14/23  0706   Hemoglobin A1C % 5.2       Radiology:      Assessment/Plan     Dot Coleman is a 62 y.o.female with:    1. Left ear impacted cerumen  - s/p debrox  -will so ear irrigation today    2. Chronic nasal congestion-uncontrolled  -c/w flonase  -patient advised to start taking claritin  -trial of atrovent as well    3. H/o mild depression  -well controlled on current regimen    4. Osteopenia  -per recent DEXA(lumbar vertebrae)  -c/w Ca/Vit D supplements      -Continue current medications and maintain follow up with specialists.      Patient verbalizes understanding and agrees with current treatment plan.      Oluwakemi Adeyanju, MD [facility]

## 2023-07-18 ENCOUNTER — PATIENT OUTREACH (OUTPATIENT)
Dept: ADMINISTRATIVE | Facility: HOSPITAL | Age: 63
End: 2023-07-18
Payer: COMMERCIAL

## 2023-07-18 NOTE — PROGRESS NOTES
Non-compliant GAP report chart review - Chart review completed for the following HM test if overdue  (Mammogram, Colonoscopy, Cervical Cancer Screening,  Diabetic lab testing, and/or Dilated EYE EXAM)  07/18/2023    Care Everywhere and Media reports - updates requested and reviewed.         Requested  Colonoscopy records         There are no preventive care reminders to display for this patient.

## 2023-07-18 NOTE — LETTER
AUTHORIZATION FOR RELEASE OF   CONFIDENTIAL INFORMATION    Neetu Cueto,    We are seeing Dot Coleman, date of birth 1960, in the clinic at [facility]. Sosa Carlos MD is the patient's PCP. Dot Coleman has an outstanding lab/procedure at the time we reviewed her chart. In order to help keep her health information updated, she has authorized us to request the following medical record(s):                                 ( xx )  COLONOSCOPY             Please fax records to [facility], Oluwakemi E Adeyanju, MD, [phone].     If you have any questions, please contact Bree Rangel, Care Coordinator  at [phone].                Patient Name: Dot Coleman  : 1960  Patient Phone #: [phone]

## 2023-09-07 ENCOUNTER — TELEPHONE (OUTPATIENT)
Dept: UROLOGY | Facility: CLINIC | Age: 63
End: 2023-09-07
Payer: COMMERCIAL

## 2023-09-07 NOTE — TELEPHONE ENCOUNTER
Called and spoke to the patient regarding scheduling her an appointment with . I offered the patient the next available on Oct. 04 at 11:15 pm. Patient confirmed

## 2023-09-07 NOTE — TELEPHONE ENCOUNTER
----- Message from Katarzyna Cadet sent at 9/7/2023  1:52 PM CDT -----  Regarding: Pt's Svitlana Benavides called to speak to the nurse to request a Same Day Appt Today for the pt for Kidney Stone Flare-up and would like an immediate call back asap  Same Day Appointment Access Request:    Pt's Svitlana Benavides called to speak to the nurse to request a Same Day Appt Today for the pt for Kidney Stone Flare-up and would like an immediate call back asap    Pt can be reached at [phone]

## 2023-09-11 ENCOUNTER — PATIENT MESSAGE (OUTPATIENT)
Dept: FAMILY MEDICINE | Facility: CLINIC | Age: 63
End: 2023-09-11
Payer: COMMERCIAL

## 2023-09-11 DIAGNOSIS — N20.0 NEPHROLITHIASIS: Primary | ICD-10-CM

## 2023-09-11 RX ORDER — TAMSULOSIN HYDROCHLORIDE 0.4 MG/1
0.4 CAPSULE ORAL DAILY
Qty: 30 CAPSULE | Refills: 0 | Status: SHIPPED | OUTPATIENT
Start: 2023-09-11 | End: 2023-10-04

## 2023-09-13 ENCOUNTER — TELEPHONE (OUTPATIENT)
Dept: OBSTETRICS AND GYNECOLOGY | Facility: CLINIC | Age: 63
End: 2023-09-13
Payer: COMMERCIAL

## 2023-10-04 ENCOUNTER — OFFICE VISIT (OUTPATIENT)
Dept: UROLOGY | Facility: CLINIC | Age: 63
End: 2023-10-04
Payer: COMMERCIAL

## 2023-10-04 VITALS
WEIGHT: 145.5 LBS | HEIGHT: 61 IN | DIASTOLIC BLOOD PRESSURE: 87 MMHG | BODY MASS INDEX: 27.47 KG/M2 | HEART RATE: 99 BPM | SYSTOLIC BLOOD PRESSURE: 118 MMHG

## 2023-10-04 DIAGNOSIS — N20.0 KIDNEY STONE: Primary | ICD-10-CM

## 2023-10-04 PROCEDURE — 3079F PR MOST RECENT DIASTOLIC BLOOD PRESSURE 80-89 MM HG: ICD-10-PCS | Mod: CPTII,S$GLB,, | Performed by: UROLOGY

## 2023-10-04 PROCEDURE — 3074F SYST BP LT 130 MM HG: CPT | Mod: CPTII,S$GLB,, | Performed by: UROLOGY

## 2023-10-04 PROCEDURE — 1160F RVW MEDS BY RX/DR IN RCRD: CPT | Mod: CPTII,S$GLB,, | Performed by: UROLOGY

## 2023-10-04 PROCEDURE — 3008F PR BODY MASS INDEX (BMI) DOCUMENTED: ICD-10-PCS | Mod: CPTII,S$GLB,, | Performed by: UROLOGY

## 2023-10-04 PROCEDURE — 99213 OFFICE O/P EST LOW 20 MIN: CPT | Mod: S$GLB,,, | Performed by: UROLOGY

## 2023-10-04 PROCEDURE — 99999 PR PBB SHADOW E&M-EST. PATIENT-LVL III: ICD-10-PCS | Mod: PBBFAC,,, | Performed by: UROLOGY

## 2023-10-04 PROCEDURE — 99999 PR PBB SHADOW E&M-EST. PATIENT-LVL III: CPT | Mod: PBBFAC,,, | Performed by: UROLOGY

## 2023-10-04 PROCEDURE — 3074F PR MOST RECENT SYSTOLIC BLOOD PRESSURE < 130 MM HG: ICD-10-PCS | Mod: CPTII,S$GLB,, | Performed by: UROLOGY

## 2023-10-04 PROCEDURE — 99213 PR OFFICE/OUTPT VISIT, EST, LEVL III, 20-29 MIN: ICD-10-PCS | Mod: S$GLB,,, | Performed by: UROLOGY

## 2023-10-04 PROCEDURE — 1159F PR MEDICATION LIST DOCUMENTED IN MEDICAL RECORD: ICD-10-PCS | Mod: CPTII,S$GLB,, | Performed by: UROLOGY

## 2023-10-04 PROCEDURE — 1159F MED LIST DOCD IN RCRD: CPT | Mod: CPTII,S$GLB,, | Performed by: UROLOGY

## 2023-10-04 PROCEDURE — 3044F HG A1C LEVEL LT 7.0%: CPT | Mod: CPTII,S$GLB,, | Performed by: UROLOGY

## 2023-10-04 PROCEDURE — 3008F BODY MASS INDEX DOCD: CPT | Mod: CPTII,S$GLB,, | Performed by: UROLOGY

## 2023-10-04 PROCEDURE — 1160F PR REVIEW ALL MEDS BY PRESCRIBER/CLIN PHARMACIST DOCUMENTED: ICD-10-PCS | Mod: CPTII,S$GLB,, | Performed by: UROLOGY

## 2023-10-04 PROCEDURE — 3044F PR MOST RECENT HEMOGLOBIN A1C LEVEL <7.0%: ICD-10-PCS | Mod: CPTII,S$GLB,, | Performed by: UROLOGY

## 2023-10-04 PROCEDURE — 3079F DIAST BP 80-89 MM HG: CPT | Mod: CPTII,S$GLB,, | Performed by: UROLOGY

## 2023-10-04 NOTE — PROGRESS NOTES
Subjective:       Patient ID: Dot Coleman is a 63 y.o. female.    Chief Complaint: Follow-up (KUB/US)       This is a 63 y.o.  female patient With kidney stones.  CT 2022 showed left 5mm UVJ stone with hydronephrosis, bilateral NOS.  Passed a stone.  Notes h/o stones, treated years ago with what sounds like ureteroscopy.    NO pain after.     10/23 ANTONIO left 6mm stone, seen on KUB smaller left non obstructing stones; mild right hydronephrosis.    Had some flank pain recently on both sides and passed some small stone fragments.            Lab Results   Component Value Date    CREATININE 0.69 06/14/2023       ---  PMH/PSH/Medications/Allergies/Social history reviewed and as in chart.    Review of Systems   Constitutional:  Negative for activity change, chills, fatigue and fever.   Respiratory:  Negative for cough, chest tightness and shortness of breath.    Cardiovascular:  Negative for chest pain.   Gastrointestinal:  Negative for abdominal distention, abdominal pain, nausea and vomiting.   Genitourinary:  Negative for difficulty urinating, flank pain, hematuria and pelvic pain.   Musculoskeletal:  Negative for back pain and gait problem.       Objective:      Physical Exam  HENT:      Head: Normocephalic.   Pulmonary:      Effort: Pulmonary effort is normal.   Abdominal:      General: Abdomen is flat.   Musculoskeletal:      Cervical back: Normal range of motion.   Skin:     General: Skin is warm.   Neurological:      General: No focal deficit present.      Mental Status: She is alert.           ANTONIO 7/22:    Impression:     1. Bilateral nonobstructing nephrolithiasis.  2. Simple left renal cyst.    KUB 7/22:  FINDINGS:  Intestinal gas pattern is nonspecific.  No bowel obstruction or ileus.  Multiple left renal calculi measuring up to 6 mm in the lower pole.  Calcifications projecting over the left sacrum have a linear orientation and are favored to be vascular, though cannot entirely exclude ureteral calculus.   Numerous additional calcifications in the inferior pelvis are most likely phleboliths.     Assessment:     Problem Noted   Kidney Stone 7/7/2022    Bilateral stones, left up to 7mm and mutiple, right 4mm and single stone  H/o treatment in past  Left cortical stones large 1+ cm    ANTONIO and KUB: right small stones not seen on KUB, left up to 6mm, larger LP stone is cortical.       Left Ureteral Stone 7/7/2022    5mm left UVJ  Passed stone that is nearly 5mm, decreased pain  100% Calcium oxalate monohydrate          Plan:     ANTONIO and KUB reviewed  Discussed options for left non obstructing stones: ESWL, URS, observation. Wishes to observe at current.  Follow up in 6 months with ANTONIO/KUB     Sukhdeep Joya MD

## 2023-11-30 ENCOUNTER — PATIENT OUTREACH (OUTPATIENT)
Dept: ADMINISTRATIVE | Facility: HOSPITAL | Age: 63
End: 2023-11-30
Payer: COMMERCIAL

## 2023-11-30 NOTE — PROGRESS NOTES
Care Everywhere updates requested and reviewed.  Immunizations reconciled. Media reports reviewed.  Duplicate HM overrides and  orders removed.  Overdue HM topic chart audit and/or requested.  Overdue lab testing linked to upcoming lab appointments if applies.          Health Maintenance Due   Topic Date Due    RSV Vaccine (Age 60+ and Pregnant patients) (1 - 1-dose 60+ series) Never done    COVID-19 Vaccine (10 - 2023-24 season) 2023    Mammogram  2024

## 2023-12-14 ENCOUNTER — PATIENT MESSAGE (OUTPATIENT)
Dept: FAMILY MEDICINE | Facility: CLINIC | Age: 63
End: 2023-12-14
Payer: COMMERCIAL

## 2023-12-18 ENCOUNTER — OFFICE VISIT (OUTPATIENT)
Dept: OBSTETRICS AND GYNECOLOGY | Facility: CLINIC | Age: 63
End: 2023-12-18
Payer: COMMERCIAL

## 2023-12-18 VITALS
SYSTOLIC BLOOD PRESSURE: 128 MMHG | WEIGHT: 149.69 LBS | BODY MASS INDEX: 28.28 KG/M2 | DIASTOLIC BLOOD PRESSURE: 90 MMHG

## 2023-12-18 DIAGNOSIS — Z01.419 ROUTINE GYNECOLOGICAL EXAMINATION: Primary | ICD-10-CM

## 2023-12-18 DIAGNOSIS — Z12.31 VISIT FOR SCREENING MAMMOGRAM: ICD-10-CM

## 2023-12-18 DIAGNOSIS — Z12.4 CERVICAL CANCER SCREENING: ICD-10-CM

## 2023-12-18 PROCEDURE — 99999 PR PBB SHADOW E&M-EST. PATIENT-LVL III: ICD-10-PCS | Mod: PBBFAC,,, | Performed by: OBSTETRICS & GYNECOLOGY

## 2023-12-18 PROCEDURE — 3074F PR MOST RECENT SYSTOLIC BLOOD PRESSURE < 130 MM HG: ICD-10-PCS | Mod: CPTII,S$GLB,, | Performed by: OBSTETRICS & GYNECOLOGY

## 2023-12-18 PROCEDURE — 3044F PR MOST RECENT HEMOGLOBIN A1C LEVEL <7.0%: ICD-10-PCS | Mod: CPTII,S$GLB,, | Performed by: OBSTETRICS & GYNECOLOGY

## 2023-12-18 PROCEDURE — 88175 CYTOPATH C/V AUTO FLUID REDO: CPT | Performed by: OBSTETRICS & GYNECOLOGY

## 2023-12-18 PROCEDURE — 99999 PR PBB SHADOW E&M-EST. PATIENT-LVL III: CPT | Mod: PBBFAC,,, | Performed by: OBSTETRICS & GYNECOLOGY

## 2023-12-18 PROCEDURE — 3080F DIAST BP >= 90 MM HG: CPT | Mod: CPTII,S$GLB,, | Performed by: OBSTETRICS & GYNECOLOGY

## 2023-12-18 PROCEDURE — 3080F PR MOST RECENT DIASTOLIC BLOOD PRESSURE >= 90 MM HG: ICD-10-PCS | Mod: CPTII,S$GLB,, | Performed by: OBSTETRICS & GYNECOLOGY

## 2023-12-18 PROCEDURE — 3008F BODY MASS INDEX DOCD: CPT | Mod: CPTII,S$GLB,, | Performed by: OBSTETRICS & GYNECOLOGY

## 2023-12-18 PROCEDURE — 1159F PR MEDICATION LIST DOCUMENTED IN MEDICAL RECORD: ICD-10-PCS | Mod: CPTII,S$GLB,, | Performed by: OBSTETRICS & GYNECOLOGY

## 2023-12-18 PROCEDURE — 3008F PR BODY MASS INDEX (BMI) DOCUMENTED: ICD-10-PCS | Mod: CPTII,S$GLB,, | Performed by: OBSTETRICS & GYNECOLOGY

## 2023-12-18 PROCEDURE — 99396 PR PREVENTIVE VISIT,EST,40-64: ICD-10-PCS | Mod: S$GLB,,, | Performed by: OBSTETRICS & GYNECOLOGY

## 2023-12-18 PROCEDURE — 1159F MED LIST DOCD IN RCRD: CPT | Mod: CPTII,S$GLB,, | Performed by: OBSTETRICS & GYNECOLOGY

## 2023-12-18 PROCEDURE — 99396 PREV VISIT EST AGE 40-64: CPT | Mod: S$GLB,,, | Performed by: OBSTETRICS & GYNECOLOGY

## 2023-12-18 PROCEDURE — 3044F HG A1C LEVEL LT 7.0%: CPT | Mod: CPTII,S$GLB,, | Performed by: OBSTETRICS & GYNECOLOGY

## 2023-12-18 PROCEDURE — 3074F SYST BP LT 130 MM HG: CPT | Mod: CPTII,S$GLB,, | Performed by: OBSTETRICS & GYNECOLOGY

## 2023-12-18 RX ORDER — ESTRADIOL 0.1 MG/G
1 CREAM VAGINAL
Qty: 42.5 G | Refills: 4 | Status: SHIPPED | OUTPATIENT
Start: 2023-12-18 | End: 2024-12-17

## 2023-12-18 NOTE — PROGRESS NOTES
62 yo postmenopausal female who presents for routine gyn visit.  No gyn complaints.  No episodes of PMB.  . Declines std testing.  Wants refills on estrace cream.  Family h/o colon cancer.    ROS:  GENERAL: Denies weight gain or weight loss. Feeling well overall.   SKIN: Denies rash or lesions.   HEAD: Denies head injury or headache.   CHEST: Denies chest pain or shortness of breath.   CARDIOVASCULAR: Denies palpitations or left sided chest pain.   ABDOMEN: No abdominal pain, constipation, diarrhea, nausea, vomiting or rectal bleeding.   URINARY: No frequency, dysuria, hematuria, or burning on urination.  REPRODUCTIVE: See HPI.   BREASTS: denies pain, lumps, or nipple discharge.   HEMATOLOGIC: No easy bruisability or excessive bleeding.   MUSCULOSKELETAL: Denies joint pain or swelling.   NEUROLOGIC: Denies syncope or weakness.   PSYCHIATRIC: Denies depression, anxiety or mood swings.       PE:   Vitals: BP (!) 128/90   Wt 67.9 kg (149 lb 11.1 oz)   BMI 28.28 kg/m²   APPEARANCE: Well nourished, well developed, in no acute distress.  SKIN: Normal skin turgor, no lesions.  ABDOMEN: Soft. No tenderness or masses. No hepatosplenomegaly. No hernias.  BREASTS: Symmetrical, no skin changes or visible lesions. No palpable masses, nipple discharge or adenopathy bilaterally.  PELVIC: Normal external female genitalia without lesions. Normal hair distribution. Adequate perineal body, normal urethral meatus. Vagina moist and well rugated without lesions or discharge. Cervix pink and without lesions. No significant cystocele or rectocele. Bimanual exam showed uterus normal size, shape, position, mobile and nontender. Adnexa without masses or tenderness. Urethra and bladder normal.            AP  Routine gyn  -s/p normal breast exam: mammogram ordered  -s/p normal pelvic exam:   -Pap collected  -STD testing: declined  -colon cancer screeing:   every 5 yrs; due 2025  -rx for estrace cream sent    ALFREDO Lopez MD

## 2023-12-22 NOTE — PROGRESS NOTES
pap is normal    Your pelvic exam will still need to occur once a year!    See you then!    Dr washington

## 2024-01-31 ENCOUNTER — PATIENT MESSAGE (OUTPATIENT)
Dept: OBSTETRICS AND GYNECOLOGY | Facility: CLINIC | Age: 64
End: 2024-01-31
Payer: COMMERCIAL

## 2024-05-23 ENCOUNTER — PATIENT MESSAGE (OUTPATIENT)
Dept: FAMILY MEDICINE | Facility: CLINIC | Age: 64
End: 2024-05-23

## 2024-05-23 ENCOUNTER — TELEPHONE (OUTPATIENT)
Dept: FAMILY MEDICINE | Facility: CLINIC | Age: 64
End: 2024-05-23

## 2024-05-23 ENCOUNTER — E-VISIT (OUTPATIENT)
Dept: FAMILY MEDICINE | Facility: CLINIC | Age: 64
End: 2024-05-23
Payer: COMMERCIAL

## 2024-05-23 ENCOUNTER — PATIENT MESSAGE (OUTPATIENT)
Dept: FAMILY MEDICINE | Facility: CLINIC | Age: 64
End: 2024-05-23
Payer: COMMERCIAL

## 2024-05-23 ENCOUNTER — TELEPHONE (OUTPATIENT)
Dept: FAMILY MEDICINE | Facility: CLINIC | Age: 64
End: 2024-05-23
Payer: COMMERCIAL

## 2024-05-23 DIAGNOSIS — R19.7 DIARRHEA OF PRESUMED INFECTIOUS ORIGIN: Primary | ICD-10-CM

## 2024-05-23 PROCEDURE — 99423 OL DIG E/M SVC 21+ MIN: CPT | Mod: ,,, | Performed by: STUDENT IN AN ORGANIZED HEALTH CARE EDUCATION/TRAINING PROGRAM

## 2024-05-23 NOTE — TELEPHONE ENCOUNTER
----- Message from Sosa Carlos MD sent at 5/23/2024 11:55 AM CDT -----  Please let her know :    -Ensure you stay off any form of juice, soda, fatty diet  -drink at least 12-14 cups of water daily  -buy over the counter oral rehydration solution ( such as liquid I.V) and drink at least 16-32 oz until diarrhea resolves  -okay to take the imodium 2mg every hrs, do not exceed 8mg in a day  -will discuss further during your clinic visit tomorrow.

## 2024-05-23 NOTE — TELEPHONE ENCOUNTER
Called and spoke with pt; Pt has been advised of all notes from Dr. Barry and advises that she understood

## 2024-05-23 NOTE — PROGRESS NOTES
Based on above symptoms    Ass: Acute watery diarrhea    Plan:  -Ensure you stay off any form of juice, soda, fatty diet  -drink at least 12-14 cups of water daily  -buy over the counter oral rehydration solution ( such as liquid I.V) and drink at least 16-32 oz until diarrhea resolves  -okay to take the imodium 2mg every hrs, do not exceed 8mg in a day  -will discuss further during your clinic visit tomorrow.

## 2024-05-24 ENCOUNTER — OFFICE VISIT (OUTPATIENT)
Dept: FAMILY MEDICINE | Facility: CLINIC | Age: 64
End: 2024-05-24
Payer: COMMERCIAL

## 2024-05-24 VITALS
WEIGHT: 144.19 LBS | SYSTOLIC BLOOD PRESSURE: 126 MMHG | DIASTOLIC BLOOD PRESSURE: 84 MMHG | TEMPERATURE: 98 F | HEIGHT: 61 IN | BODY MASS INDEX: 27.22 KG/M2 | OXYGEN SATURATION: 98 % | HEART RATE: 77 BPM

## 2024-05-24 DIAGNOSIS — Z00.00 ANNUAL PHYSICAL EXAM: Primary | ICD-10-CM

## 2024-05-24 DIAGNOSIS — R19.7 DIARRHEA OF PRESUMED INFECTIOUS ORIGIN: ICD-10-CM

## 2024-05-24 DIAGNOSIS — E78.49 OTHER HYPERLIPIDEMIA: ICD-10-CM

## 2024-05-24 DIAGNOSIS — E66.3 OVERWEIGHT WITH BODY MASS INDEX (BMI) OF 27 TO 27.9 IN ADULT: ICD-10-CM

## 2024-05-24 PROCEDURE — 99396 PREV VISIT EST AGE 40-64: CPT | Mod: 25,S$GLB,, | Performed by: STUDENT IN AN ORGANIZED HEALTH CARE EDUCATION/TRAINING PROGRAM

## 2024-05-24 PROCEDURE — 3008F BODY MASS INDEX DOCD: CPT | Mod: CPTII,S$GLB,, | Performed by: STUDENT IN AN ORGANIZED HEALTH CARE EDUCATION/TRAINING PROGRAM

## 2024-05-24 PROCEDURE — 1159F MED LIST DOCD IN RCRD: CPT | Mod: CPTII,S$GLB,, | Performed by: STUDENT IN AN ORGANIZED HEALTH CARE EDUCATION/TRAINING PROGRAM

## 2024-05-24 PROCEDURE — 1160F RVW MEDS BY RX/DR IN RCRD: CPT | Mod: CPTII,S$GLB,, | Performed by: STUDENT IN AN ORGANIZED HEALTH CARE EDUCATION/TRAINING PROGRAM

## 2024-05-24 PROCEDURE — 99999 PR PBB SHADOW E&M-EST. PATIENT-LVL III: CPT | Mod: PBBFAC,,, | Performed by: STUDENT IN AN ORGANIZED HEALTH CARE EDUCATION/TRAINING PROGRAM

## 2024-05-24 PROCEDURE — 3074F SYST BP LT 130 MM HG: CPT | Mod: CPTII,S$GLB,, | Performed by: STUDENT IN AN ORGANIZED HEALTH CARE EDUCATION/TRAINING PROGRAM

## 2024-05-24 PROCEDURE — 3079F DIAST BP 80-89 MM HG: CPT | Mod: CPTII,S$GLB,, | Performed by: STUDENT IN AN ORGANIZED HEALTH CARE EDUCATION/TRAINING PROGRAM

## 2024-05-24 NOTE — PROGRESS NOTES
[facility] Note    Chief Complaint      Chief Complaint   Patient presents with    Diarrhea  Annual physicals      History of Present Illness     Dot Coleman is a 63 y.o. female with HLD, h/o knee cap fracture (from the ground level (2 years ago) , PMS and mild depression who presents with acute watery diarrhea in the past 5 days about 5-8 times daily but getting better since today, she had lunch on Sunday ( she ate salad, ) with a group of friends one of which also had diarrhea but has gotten better except her. She also had an episode of  vomiting , No fever, chills , abdominal pain, mucous or blood in the stool.  She is also due for her annual physicals.     Problem List Addressed This Visit:    1. Annual physical exam  -     Lipid Panel; Future; Expected date: 05/24/2024  -     TSH; Future; Expected date: 05/24/2024  -     Hemoglobin A1C; Future; Expected date: 05/24/2024  -     Comprehensive Metabolic Panel; Future; Expected date: 05/24/2024  -     CBC Auto Differential; Future; Expected date: 05/24/2024    2. Diarrhea of presumed infectious origin    3. Other hyperlipidemia    4. Overweight with body mass index (BMI) of 27 to 27.9 in adult         Health Maintenance   Topic Date Due    Colorectal Cancer Screening  06/11/2025 (Originally 10/4/2007)    Mammogram  02/01/2025    Lipid Panel  06/14/2028    TETANUS VACCINE  06/12/2033    Hepatitis C Screening  Completed    Shingles Vaccine  Completed       History reviewed. No pertinent past medical history.    Past Surgical History:   Procedure Laterality Date    BREAST CYST ASPIRATION      TUBAL LIGATION         family history includes Breast cancer in her maternal aunt; Colon cancer in her father and maternal aunt.    Social History     Tobacco Use    Smoking status: Never    Smokeless tobacco: Never   Substance Use Topics    Alcohol use: Yes     Comment: rare    Drug use: Never       Review of Systems   Constitutional:  Positive for  "fatigue. Negative for fever.   Respiratory:  Negative for cough and chest tightness.    Gastrointestinal:  Positive for diarrhea. Negative for abdominal pain and vomiting.   Genitourinary:  Negative for difficulty urinating, dysuria and frequency.   Musculoskeletal:  Negative for arthralgias, back pain, gait problem and joint swelling.   Skin:  Positive for rash (upper back).   Neurological:  Negative for seizures, weakness, numbness and headaches.   Psychiatric/Behavioral:  Negative for sleep disturbance.        Outpatient Encounter Medications as of 5/24/2024   Medication Sig Dispense Refill    DULoxetine (CYMBALTA) 30 MG capsule Take 1 capsule (30 mg total) by mouth once daily. 30 capsule 11    estradioL (ESTRACE) 0.01 % (0.1 mg/gram) vaginal cream Place 1 g vaginally twice a week. 42.5 g 4    rosuvastatin (CRESTOR) 10 MG tablet Take 1 tablet (10 mg total) by mouth once daily. 90 tablet 3    triamcinolone acetonide 0.1% (KENALOG) 0.1 % ointment Apply topically 2 (two) times daily. 80 g 0    [DISCONTINUED] varicella-zoster gE-AS01B, PF, (SHINGRIX, PF,) 50 mcg/0.5 mL injection Inject 0.5mL IM at 0 and 2-6 months (Patient not taking: Reported on 12/18/2023) 1 each 1     No facility-administered encounter medications on file as of 5/24/2024.        Review of patient's allergies indicates:   Allergen Reactions    Orange Swelling    Penicillins Other (See Comments)     Shakes       Peanut Hives     unknown       Physical Exam      Vital Signs  Temp: 97.9 °F (36.6 °C)  Temp Source: Temporal  Pulse: 77  SpO2: 98 %  BP: 126/84  BP Location: Left arm  Patient Position: Sitting  Pain Score: 0-No pain  Height and Weight  Height: 5' 1" (154.9 cm)  Weight: 65.4 kg (144 lb 2.9 oz)  BSA (Calculated - sq m): 1.68 sq meters  BMI (Calculated): 27.3  Weight in (lb) to have BMI = 25: 132]    Physical Exam  Vitals reviewed.   Constitutional:       Appearance: Normal appearance.   HENT:      Head: Normocephalic and atraumatic.      " "Right Ear: Tympanic membrane normal. There is no impacted cerumen.      Left Ear: Tympanic membrane normal. There is no impacted cerumen.      Mouth/Throat:      Mouth: Mucous membranes are moist.      Pharynx: Oropharynx is clear.   Eyes:      Extraocular Movements: Extraocular movements intact.      Conjunctiva/sclera: Conjunctivae normal.      Pupils: Pupils are equal, round, and reactive to light.   Cardiovascular:      Rate and Rhythm: Normal rate and regular rhythm.      Pulses: Normal pulses.      Heart sounds: Normal heart sounds.   Pulmonary:      Effort: Pulmonary effort is normal.      Breath sounds: Normal breath sounds.   Abdominal:      General: Abdomen is flat. Bowel sounds are normal.      Palpations: Abdomen is soft.   Musculoskeletal:      Cervical back: Normal range of motion.      Right lower leg: No edema.      Left lower leg: No edema.   Lymphadenopathy:      Cervical: No cervical adenopathy.   Skin:     General: Skin is warm and dry.      Findings: Rash: hyperpigmented scaly rash on upper left back.   Neurological:      General: No focal deficit present.      Mental Status: She is alert and oriented to person, place, and time.      Sensory: No sensory deficit.   Psychiatric:         Mood and Affect: Mood normal.         Behavior: Behavior normal.          Laboratory:  CBC:  No results for input(s): "WBC", "RBC", "HGB", "HCT", "PLT", "MCV", "MCH", "MCHC" in the last 2160 hours.    CMP:  No results for input(s): "GLU", "CALCIUM", "ALBUMIN", "PROT", "NA", "K", "CO2", "CL", "BUN", "ALKPHOS", "ALT", "AST", "BILITOT" in the last 2160 hours.    Invalid input(s): "CREATININ"    URINALYSIS:  No results for input(s): "COLORU", "CLARITYU", "SPECGRAV", "PHUR", "PROTEINUA", "GLUCOSEU", "BILIRUBINCON", "BLOODU", "WBCU", "RBCU", "BACTERIA", "MUCUS", "NITRITE", "LEUKOCYTESUR", "UROBILINOGEN", "HYALINECASTS" in the last 2160 hours.   LIPIDS:  No results for input(s): "TSH", "HDL", "CHOL", "TRIG", "LDLCALC", " ""CHOLHDL", "NONHDLCHOL", "TOTALCHOLEST" in the last 2160 hours.    TSH:  No results for input(s): "TSH" in the last 2160 hours.    A1C:  No results for input(s): "HGBA1C" in the last 2160 hours.      Radiology:      Assessment/Plan     Dot Coleman is a 63 y.o.female with:    1. Annual physical exam  -preventive counseling provide  -labs due ordered  -vaccines recs: shingles, Rsva and COVID  -UTD on colorectal, breast and cervical cancer screening  -     Lipid Panel; Future; Expected date: 05/24/2024  -     TSH; Future; Expected date: 05/24/2024  -     Hemoglobin A1C; Future; Expected date: 05/24/2024  -     Comprehensive Metabolic Panel; Future; Expected date: 05/24/2024  -     CBC Auto Differential; Future; Expected date: 05/24/2024    2. Diarrhea of presumed infectious origin  -Ensure you stay off any form of juice, soda, fatty diet  -drink at least 12-14 cups of water daily  -buy over the counter oral rehydration solution ( such as liquid I.V) and drink at least 8 oz wth each episode of diarrhea until diarrhea resolves  -stop taking the imodium for now    3. Other hyperlipidemia  -well controlled on current regimen ( crestor)    4. Overweight with body mass index (BMI) of 27 to 27.9 in adult  -c/w life style modifications    5. H/o mild depression  -well controlled on current regimen    6. Osteopenia  -per recent DEXA(lumbar vertebrae)  -c/w Ca/Vit D supplements      -Continue current medications and maintain follow up with specialists.      Patient verbalizes understanding and agrees with current treatment plan.      Sosa Carlos MD  [facility] [place]  "

## 2024-05-28 ENCOUNTER — TELEPHONE (OUTPATIENT)
Dept: FAMILY MEDICINE | Facility: CLINIC | Age: 64
End: 2024-05-28
Payer: COMMERCIAL

## 2024-05-28 NOTE — TELEPHONE ENCOUNTER
Returned pt phone call; Pt state she needs assistance with diarrhea that she has had since last Monday; Pt states she need tips on how to balance; Pt states has had 4 bow movements since 8am today; Pt states she's unable to leave the house because she's going to the restroom every 1-2 hrs; Pt states she does not have any pain or cramps and has lost 8 punds due to her not able to hold anything in her stomach; Please help assist pt with tips on what to do

## 2024-05-28 NOTE — TELEPHONE ENCOUNTER
----- Message from Codie Hernandez sent at 5/28/2024 10:47 AM CDT -----  Type:  Needs Medical Advice    Who Called: Pt   Symptoms (please be specific):  Still having ongoing diarrhea   How long has patient had these symptoms:  05/24  Pharmacy name and phone #:    [facility]  [id number] [facility] [address] [place] [address]  Phone: [phone] Fax: [phone]  Would the patient rather a call back or a response via [facility]? Call back   Best Call Back Number: [phone]

## 2024-05-29 ENCOUNTER — OFFICE VISIT (OUTPATIENT)
Dept: FAMILY MEDICINE | Facility: CLINIC | Age: 64
End: 2024-05-29
Payer: COMMERCIAL

## 2024-05-29 VITALS
DIASTOLIC BLOOD PRESSURE: 68 MMHG | TEMPERATURE: 97 F | HEIGHT: 61 IN | HEART RATE: 60 BPM | WEIGHT: 145.38 LBS | OXYGEN SATURATION: 97 % | SYSTOLIC BLOOD PRESSURE: 102 MMHG | BODY MASS INDEX: 27.45 KG/M2

## 2024-05-29 DIAGNOSIS — R11.0 NAUSEA: ICD-10-CM

## 2024-05-29 DIAGNOSIS — R19.7 DIARRHEA, UNSPECIFIED TYPE: Primary | ICD-10-CM

## 2024-05-29 PROCEDURE — 1160F RVW MEDS BY RX/DR IN RCRD: CPT | Mod: CPTII,S$GLB,,

## 2024-05-29 PROCEDURE — 99999 PR PBB SHADOW E&M-EST. PATIENT-LVL V: CPT | Mod: PBBFAC,,,

## 2024-05-29 PROCEDURE — 3078F DIAST BP <80 MM HG: CPT | Mod: CPTII,S$GLB,,

## 2024-05-29 PROCEDURE — 3044F HG A1C LEVEL LT 7.0%: CPT | Mod: CPTII,S$GLB,,

## 2024-05-29 PROCEDURE — 99214 OFFICE O/P EST MOD 30 MIN: CPT | Mod: S$GLB,,,

## 2024-05-29 PROCEDURE — 1159F MED LIST DOCD IN RCRD: CPT | Mod: CPTII,S$GLB,,

## 2024-05-29 PROCEDURE — 3074F SYST BP LT 130 MM HG: CPT | Mod: CPTII,S$GLB,,

## 2024-05-29 PROCEDURE — 3008F BODY MASS INDEX DOCD: CPT | Mod: CPTII,S$GLB,,

## 2024-05-29 RX ORDER — TRIAMCINOLONE ACETONIDE 1 MG/G
OINTMENT TOPICAL 2 TIMES DAILY
Qty: 80 G | Refills: 0 | Status: CANCELLED | OUTPATIENT
Start: 2024-05-29

## 2024-05-29 RX ORDER — ONDANSETRON 4 MG/1
4 TABLET, ORALLY DISINTEGRATING ORAL 2 TIMES DAILY
Qty: 30 TABLET | Refills: 0 | Status: SHIPPED | OUTPATIENT
Start: 2024-05-29 | End: 2024-06-13

## 2024-05-29 NOTE — PATIENT INSTRUCTIONS
-Blood work and stool studies today. Will message you on portal with results  -Zofran as needed for nausea  -Continue with hydration replacements- pedialyte, IV hydation, water  -RTC if symptoms worsen or not improving   -Report to the ER if symptoms worsen and need immediate evaluation

## 2024-05-29 NOTE — PROGRESS NOTES
"Subjective:        Chief Complaint: Diarrhea     Dot Coleman is a 63 y.o. female, patient of Sosa Carlos MD    unknown to me, presents today with complaints of Diarrhea    Presents alone today with complaints of diarrhea since the night of 5/20/2024. Reports approximately 8 episodes of diarrhea per day for the first week and approximately 5 episodes of diarrhea these past few days. She did have vomiting initially for 1 day but since then the vomiting has resolved and only remains with nausea and a decreased appetite. She reports the only things she ate prior to the diarrhea starting was a salad on Sunday and then a turkey sandwich for lunch and salmon with veggies for dinner on Monday. She was seen by Dr. Carlos (her PCP) on 5/24/2024 for her diarrhea, instructed to increase oral hydration and to modify her diet. She also had lab work done at the visit (CBC, CMP, Lipid panel, TSH, A1C) and all were WNL. Since that visit, she reports the diarrhea is improving but still remains to be  problem for her, having approximately 5 BM's (loose, bile colored stool) per day. She states anything she eats or drinks "goes right through" her. She denies any fevers, chills, abdominal pain, abdominal cramping, blood in stool, or black colored stool. Denies any recent travel or antibiotic use.     No other concerns voiced.     History reviewed. No pertinent past medical history.    Past Surgical History:   Procedure Laterality Date    BREAST CYST ASPIRATION      TUBAL LIGATION         Family History   Problem Relation Name Age of Onset    Colon cancer Father      Breast cancer Maternal Aunt [place]nita     Colon cancer Maternal Aunt [place]     Ovarian cancer Neg Hx         Social History     Socioeconomic History    Marital status:    Tobacco Use    Smoking status: Never    Smokeless tobacco: Never   Substance and Sexual Activity    Alcohol use: Yes     Comment: rare    Drug use: Never    Sexual activity: Not " "Currently     Partners: Male     Birth control/protection: See Surgical Hx     Social Determinants of Health     Financial Resource Strain: Low Risk  (5/23/2024)    Overall Financial Resource Strain (CARDIA)     Difficulty of Paying Living Expenses: Not hard at all   Food Insecurity: No Food Insecurity (5/23/2024)    Hunger Vital Sign     Worried About Running Out of Food in the Last Year: Never true     Ran Out of Food in the Last Year: Never true   Physical Activity: Sufficiently Active (5/23/2024)    Exercise Vital Sign     Days of Exercise per Week: 5 days     Minutes of Exercise per Session: 60 min   Stress: No Stress Concern Present (5/23/2024)    [employer] - Occupational Stress Questionnaire     Feeling of Stress : Not at all   Housing Stability: Unknown (5/23/2024)    Housing Stability Vital Sign     Unable to Pay for Housing in the Last Year: No       Review of Systems   Constitutional:  Positive for appetite change. Negative for chills and fever.   Respiratory:  Negative for shortness of breath.    Cardiovascular:  Negative for chest pain.   Gastrointestinal:  Positive for diarrhea and nausea. Negative for abdominal distention, abdominal pain, blood in stool, constipation and vomiting.   Genitourinary:  Negative for dysuria, frequency and urgency.   Neurological:  Negative for dizziness and weakness.         Objective:     Vitals:    05/29/24 0813   BP: 102/68   BP Location: Left arm   Patient Position: Sitting   BP Method: Large (Manual)   Pulse: 60   Temp: 97.4 °F (36.3 °C)   TempSrc: Oral   SpO2: 97%   Weight: 65.9 kg (145 lb 6.3 oz)   Height: 5' 1" (1.549 m)          Physical Exam  Vitals reviewed.   Constitutional:       General: She is not in acute distress.     Appearance: She is not ill-appearing.   HENT:      Head: Normocephalic and atraumatic.      Right Ear: Tympanic membrane normal.      Left Ear: Tympanic membrane normal.      Mouth/Throat:      Mouth: Mucous " membranes are moist.      Pharynx: No oropharyngeal exudate or posterior oropharyngeal erythema.   Eyes:      General:         Right eye: No discharge.         Left eye: No discharge.   Cardiovascular:      Rate and Rhythm: Normal rate and regular rhythm.   Pulmonary:      Effort: Pulmonary effort is normal.      Breath sounds: Normal breath sounds.   Abdominal:      General: Abdomen is flat. There is no distension.      Palpations: Abdomen is soft. There is no mass.      Tenderness: There is no abdominal tenderness. There is no guarding.   Musculoskeletal:         General: Normal range of motion.   Skin:     General: Skin is warm and dry.   Neurological:      General: No focal deficit present.      Mental Status: She is alert. Mental status is at baseline.   Psychiatric:         Mood and Affect: Mood normal.         Behavior: Behavior normal.         Laboratory:  CBC:  Recent Labs   Lab Result Units 05/24/24  1107   WBC K/uL 4.20   RBC M/uL 4.57   Hemoglobin g/dL 14.5   Hematocrit % 43.7   Platelets K/uL 184   MCV fL 96   MCH pg 31.7*   MCHC g/dL 33.2     CMP:  Recent Labs   Lab Result Units 05/24/24  1107 05/29/24  0904   Glucose mg/dL 85 62*   Calcium mg/dL 9.6 9.8   Albumin g/dL 3.8  --    Total Protein g/dL 7.0  --    Sodium mmol/L 140 142   Potassium mmol/L 4.0 4.1   CO2 mmol/L 24 26   Chloride mmol/L 106 109   BUN mg/dL 13 17   Alkaline Phosphatase U/L 99  --    ALT U/L 21  --    AST U/L 17  --    Total Bilirubin mg/dL 1.0  --         LIPIDS:  Recent Labs   Lab Result Units 05/24/24  1107   TSH uIU/mL 1.336   HDL mg/dL 52   Cholesterol mg/dL 180   Triglycerides mg/dL 77   LDL Cholesterol mg/dL 112.6   HDL/Cholesterol Ratio % 28.9   Non-HDL Cholesterol mg/dL 128   Total Cholesterol/HDL Ratio  3.5     TSH:  Recent Labs   Lab Result Units 05/24/24  1107   TSH uIU/mL 1.336     A1C:  Recent Labs   Lab Result Units 05/24/24  1107   Hemoglobin A1C % 5.2       Assessment:         ICD-10-CM ICD-9-CM   1. Diarrhea,  unspecified type  R19.7 787.91   2. Nausea  R11.0 787.02       Plan:       Diarrhea, unspecified type  -     CULTURE, STOOL; Future; Expected date: 05/29/2024  -     Stool Exam-Ova,Cysts,Parasites; Future; Expected date: 05/29/2024  -     Basic Metabolic Panel; Future; Expected date: 05/29/2024  -     CLOSTRIDIUM DIFFICILE; Future; Expected date: 05/29/2024  -BMP today to check electrolytes and renal function. Will notify patient of results via portal.   -Stool studies to be collected today to r/o infectious cause. Will notify patient of results via portal.  -Continue increasing oral hydration (can use IV hydration, Pedialyte) and bland diet as tolerated.  -RTC if symptoms worsen or not improving with treatment plan.   -Report to ER if symptoms worsen and need immediate evaluation.    Nausea  -     ondansetron (ZOFRAN-ODT) 4 MG TbDL; Take 1 tablet (4 mg total) by mouth 2 (two) times daily. for 15 days  Dispense: 30 tablet; Refill: 0    RTC/ED precautions discussed where applicable.   Patient/caretaker agrees with the treatment plan.   Encouraged patient/caregiver to check with insurance regarding orders to avoid unexpected fees/costs.   Encouraged patient/caregiver to let me know if there are any further questions/concerns.   Encouraged patient/caregiver to keep all appointments with PCP and all specialist.      Follow up if symptoms worsen or fail to improve.    ANNE GonzalezP-C  Family Medicine  [facility]      Patient's Medications   New Prescriptions    ONDANSETRON (ZOFRAN-ODT) 4 MG TBDL    Dissolve 1 tablet (4 mg total) by mouth 2 (two) times daily. for 15 days   Previous Medications    DULOXETINE (CYMBALTA) 30 MG CAPSULE    Take 1 capsule (30 mg total) by mouth once daily.    ESTRADIOL (ESTRACE) 0.01 % (0.1 MG/GRAM) VAGINAL CREAM    Place 1 g vaginally twice a week.    ROSUVASTATIN (CRESTOR) 10 MG TABLET    Take 1 tablet (10 mg total) by mouth once daily.    TRIAMCINOLONE ACETONIDE  0.1% (KENALOG) 0.1 % OINTMENT    Apply topically 2 (two) times daily.   Modified Medications    No medications on file   Discontinued Medications    No medications on file

## 2024-06-17 ENCOUNTER — OFFICE VISIT (OUTPATIENT)
Dept: FAMILY MEDICINE | Facility: CLINIC | Age: 64
End: 2024-06-17
Payer: COMMERCIAL

## 2024-06-17 VITALS
DIASTOLIC BLOOD PRESSURE: 84 MMHG | SYSTOLIC BLOOD PRESSURE: 142 MMHG | HEIGHT: 61 IN | TEMPERATURE: 97 F | WEIGHT: 147.25 LBS | HEART RATE: 62 BPM | OXYGEN SATURATION: 99 % | BODY MASS INDEX: 27.8 KG/M2

## 2024-06-17 DIAGNOSIS — J02.9 SORE THROAT: ICD-10-CM

## 2024-06-17 DIAGNOSIS — R05.1 ACUTE COUGH: ICD-10-CM

## 2024-06-17 DIAGNOSIS — E78.49 OTHER HYPERLIPIDEMIA: ICD-10-CM

## 2024-06-17 DIAGNOSIS — E66.3 OVERWEIGHT WITH BODY MASS INDEX (BMI) OF 27 TO 27.9 IN ADULT: ICD-10-CM

## 2024-06-17 DIAGNOSIS — H66.002 NON-RECURRENT ACUTE SUPPURATIVE OTITIS MEDIA OF LEFT EAR WITHOUT SPONTANEOUS RUPTURE OF TYMPANIC MEMBRANE: ICD-10-CM

## 2024-06-17 PROCEDURE — 3079F DIAST BP 80-89 MM HG: CPT | Mod: CPTII,S$GLB,, | Performed by: STUDENT IN AN ORGANIZED HEALTH CARE EDUCATION/TRAINING PROGRAM

## 2024-06-17 PROCEDURE — 3077F SYST BP >= 140 MM HG: CPT | Mod: CPTII,S$GLB,, | Performed by: STUDENT IN AN ORGANIZED HEALTH CARE EDUCATION/TRAINING PROGRAM

## 2024-06-17 PROCEDURE — 1160F RVW MEDS BY RX/DR IN RCRD: CPT | Mod: CPTII,S$GLB,, | Performed by: STUDENT IN AN ORGANIZED HEALTH CARE EDUCATION/TRAINING PROGRAM

## 2024-06-17 PROCEDURE — 99999 PR PBB SHADOW E&M-EST. PATIENT-LVL III: CPT | Mod: PBBFAC,,, | Performed by: STUDENT IN AN ORGANIZED HEALTH CARE EDUCATION/TRAINING PROGRAM

## 2024-06-17 PROCEDURE — 3044F HG A1C LEVEL LT 7.0%: CPT | Mod: CPTII,S$GLB,, | Performed by: STUDENT IN AN ORGANIZED HEALTH CARE EDUCATION/TRAINING PROGRAM

## 2024-06-17 PROCEDURE — 1159F MED LIST DOCD IN RCRD: CPT | Mod: CPTII,S$GLB,, | Performed by: STUDENT IN AN ORGANIZED HEALTH CARE EDUCATION/TRAINING PROGRAM

## 2024-06-17 PROCEDURE — 99214 OFFICE O/P EST MOD 30 MIN: CPT | Mod: S$GLB,,, | Performed by: STUDENT IN AN ORGANIZED HEALTH CARE EDUCATION/TRAINING PROGRAM

## 2024-06-17 PROCEDURE — 3008F BODY MASS INDEX DOCD: CPT | Mod: CPTII,S$GLB,, | Performed by: STUDENT IN AN ORGANIZED HEALTH CARE EDUCATION/TRAINING PROGRAM

## 2024-06-17 RX ORDER — DOXYCYCLINE 100 MG/1
100 CAPSULE ORAL 2 TIMES DAILY
Qty: 14 CAPSULE | Refills: 0 | Status: SHIPPED | OUTPATIENT
Start: 2024-06-17 | End: 2024-06-24

## 2024-06-17 NOTE — PROGRESS NOTES
[facility] Note    Chief Complaint   Nasal congestion  Cough  Sore throat   X 4days  Left ear pain X 1 day    History of Present Illness     Dot Coleman is a 63 y.o. female with HLD, h/o knee cap fracture (from the ground level (2 years ago) , PMS and mild depression who presents with above complaints. No fever, chills, no CP or SOB, wheezing, sick contact.    Problem List Addressed This Visit:    1. Sore throat  -     POCT COVID-19 Rapid Screening  -     POCT Influenza A/B  -     POCT Strep A, Molecular    2. Acute cough  -     POCT COVID-19 Rapid Screening  -     POCT Influenza A/B    3. Non-recurrent acute suppurative otitis media of left ear without spontaneous rupture of tympanic membrane  -     doxycycline (VIBRAMYCIN) 100 MG Cap; Take 1 capsule (100 mg total) by mouth 2 (two) times daily. for 7 days  Dispense: 14 capsule; Refill: 0         Health Maintenance   Topic Date Due    Colorectal Cancer Screening  06/11/2025 (Originally 10/4/2007)    Mammogram  02/01/2025    Lipid Panel  05/24/2029    TETANUS VACCINE  06/12/2033    Hepatitis C Screening  Completed    Shingles Vaccine  Completed       History reviewed. No pertinent past medical history.    Past Surgical History:   Procedure Laterality Date    BREAST CYST ASPIRATION      TUBAL LIGATION         family history includes Breast cancer in her maternal aunt; Colon cancer in her father and maternal aunt.    Social History     Tobacco Use    Smoking status: Never    Smokeless tobacco: Never   Substance Use Topics    Alcohol use: Yes     Comment: rare    Drug use: Never       Review of Systems   Constitutional:  Positive for fatigue. Negative for fever.   HENT:  Positive for ear discharge, ear pain, rhinorrhea and sore throat.    Respiratory:  Positive for cough. Negative for chest tightness and shortness of breath.    Cardiovascular:  Negative for chest pain.   Gastrointestinal:  Negative for abdominal pain and vomiting.  "  Genitourinary:  Negative for difficulty urinating, dysuria and frequency.   Musculoskeletal:  Positive for neck pain. Negative for arthralgias, back pain, gait problem and joint swelling.   Skin:  Positive for rash (upper back).   Neurological:  Negative for seizures, weakness, numbness and headaches.   Psychiatric/Behavioral:  Negative for sleep disturbance.        Outpatient Encounter Medications as of 6/17/2024   Medication Sig Dispense Refill    DULoxetine (CYMBALTA) 30 MG capsule Take 1 capsule (30 mg total) by mouth once daily. 30 capsule 11    estradioL (ESTRACE) 0.01 % (0.1 mg/gram) vaginal cream Place 1 g vaginally twice a week. 42.5 g 4    rosuvastatin (CRESTOR) 10 MG tablet Take 1 tablet (10 mg total) by mouth once daily. 90 tablet 3    triamcinolone acetonide 0.1% (KENALOG) 0.1 % ointment Apply topically 2 (two) times daily. 80 g 0    doxycycline (VIBRAMYCIN) 100 MG Cap Take 1 capsule (100 mg total) by mouth 2 (two) times daily. for 7 days 14 capsule 0     No facility-administered encounter medications on file as of 6/17/2024.        Review of patient's allergies indicates:   Allergen Reactions    Orange Swelling    Penicillins Other (See Comments)     Shakes       Peanut Hives     unknown       Physical Exam      Vital Signs  Temp: 97.2 °F (36.2 °C)  Pulse: 62  SpO2: 99 %  BP: (!) 142/84  BP Location: Right arm  Patient Position: Sitting  Pain Score:   6  Height and Weight  Height: 5' 1" (154.9 cm)  Weight: 66.8 kg (147 lb 4.3 oz)  BSA (Calculated - sq m): 1.7 sq meters  BMI (Calculated): 27.8  Weight in (lb) to have BMI = 25: 132]    Physical Exam  Vitals reviewed.   Constitutional:       Appearance: Normal appearance.   HENT:      Head: Normocephalic and atraumatic.      Right Ear: There is no impacted cerumen.      Left Ear: There is no impacted cerumen.      Ears:      Comments: Erythematous/opaque Left TM     Mouth/Throat:      Mouth: Mucous membranes are moist.      Pharynx: Oropharynx is clear. " "  Eyes:      Extraocular Movements: Extraocular movements intact.      Conjunctiva/sclera: Conjunctivae normal.      Pupils: Pupils are equal, round, and reactive to light.   Cardiovascular:      Rate and Rhythm: Normal rate and regular rhythm.      Pulses: Normal pulses.      Heart sounds: Normal heart sounds.   Pulmonary:      Effort: Pulmonary effort is normal.      Breath sounds: Normal breath sounds.   Abdominal:      General: Abdomen is flat. Bowel sounds are normal.      Palpations: Abdomen is soft.   Musculoskeletal:      Cervical back: Normal range of motion.      Right lower leg: No edema.      Left lower leg: No edema.   Lymphadenopathy:      Cervical: No cervical adenopathy.   Skin:     General: Skin is warm and dry.      Findings: Rash: hyperpigmented scaly rash on upper left back.   Neurological:      General: No focal deficit present.      Mental Status: She is alert and oriented to person, place, and time.      Sensory: No sensory deficit.   Psychiatric:         Mood and Affect: Mood normal.         Behavior: Behavior normal.          Laboratory:  CBC:  Recent Labs   Lab Result Units 05/24/24  1107   WBC K/uL 4.20   RBC M/uL 4.57   Hemoglobin g/dL 14.5   Hematocrit % 43.7   Platelets K/uL 184   MCV fL 96   MCH pg 31.7*   MCHC g/dL 33.2       CMP:  Recent Labs   Lab Result Units 05/24/24  1107 05/29/24  0904   Glucose mg/dL 85 62*   Calcium mg/dL 9.6 9.8   Albumin g/dL 3.8  --    Total Protein g/dL 7.0  --    Sodium mmol/L 140 142   Potassium mmol/L 4.0 4.1   CO2 mmol/L 24 26   Chloride mmol/L 106 109   BUN mg/dL 13 17   Alkaline Phosphatase U/L 99  --    ALT U/L 21  --    AST U/L 17  --    Total Bilirubin mg/dL 1.0  --        URINALYSIS:  No results for input(s): "COLORU", "CLARITYU", "SPECGRAV", "PHUR", "PROTEINUA", "GLUCOSEU", "BILIRUBINCON", "BLOODU", "WBCU", "RBCU", "BACTERIA", "MUCUS", "NITRITE", "LEUKOCYTESUR", "UROBILINOGEN", "HYALINECASTS" in the last 2160 hours.   LIPIDS:  Recent Labs   Lab " Result Units 05/24/24  1107   TSH uIU/mL 1.336   HDL mg/dL 52   Cholesterol mg/dL 180   Triglycerides mg/dL 77   LDL Cholesterol mg/dL 112.6   HDL/Cholesterol Ratio % 28.9   Non-HDL Cholesterol mg/dL 128   Total Cholesterol/HDL Ratio  3.5       TSH:  Recent Labs   Lab Result Units 05/24/24  1107   TSH uIU/mL 1.336       A1C:  Recent Labs   Lab Result Units 05/24/24  1107   Hemoglobin A1C % 5.2         Radiology:      Assessment/Plan     Dot Coleman is a 63 y.o.female with:    1. Sore throat  -Likely viral URI  -     POCT COVID-19 Rapid Screening-negative  -     POCT Influenza A/B-negative  -     POCT Strep A, Molecular-negative  -c/w claritin daily  -flonase 2 spray in each nostril daily  -alternate tylenol with advil q8hr PRN for myalgia  -ensure adequate hydration     2. Acute cough  -Likely viral URI other than flu and COVID  -     POCT COVID-19 Rapid Screening-negative  -     POCT Influenza A/B-negative  -     POCT Strep A, Molecular-negative  -c/w claritin daily  -flonase 2 spray in each nostril daily  -alternate tylenol with advil q8hr PRN for myalgia  -ensure adequate hydration     3. Non-recurrent acute suppurative otitis media of left ear without spontaneous rupture of tympanic membrane  -allergic to penicillins  -     doxycycline (VIBRAMYCIN) 100 MG Cap; Take 1 capsule (100 mg total) by mouth 2 (two) times daily. for 7 days  Dispense: 14 capsule; Refill: 0    4. Other hyperlipidemia  -well controlled on current regimen ( crestor)    4. Overweight with body mass index (BMI) of 27 to 27.9 in adult  -c/w life style modifications    5. H/o mild depression  -well controlled on current regimen    6. Osteopenia  -per recent DEXA(lumbar vertebrae)  -c/w Ca/Vit D supplements      -Continue current medications and maintain follow up with specialists.      Patient verbalizes understanding and agrees with current treatment plan.      MD Ally MelendezB[facility]

## 2024-06-30 ENCOUNTER — PATIENT MESSAGE (OUTPATIENT)
Dept: FAMILY MEDICINE | Facility: CLINIC | Age: 64
End: 2024-06-30
Payer: COMMERCIAL

## 2024-07-01 ENCOUNTER — OFFICE VISIT (OUTPATIENT)
Dept: FAMILY MEDICINE | Facility: CLINIC | Age: 64
End: 2024-07-01
Payer: COMMERCIAL

## 2024-07-01 VITALS
BODY MASS INDEX: 28.31 KG/M2 | DIASTOLIC BLOOD PRESSURE: 82 MMHG | SYSTOLIC BLOOD PRESSURE: 138 MMHG | TEMPERATURE: 98 F | OXYGEN SATURATION: 97 % | WEIGHT: 149.94 LBS | HEIGHT: 61 IN | HEART RATE: 85 BPM

## 2024-07-01 DIAGNOSIS — R42 DIZZINESS: Primary | ICD-10-CM

## 2024-07-01 DIAGNOSIS — H83.02 LABYRINTHITIS OF LEFT EAR: ICD-10-CM

## 2024-07-01 PROCEDURE — 99214 OFFICE O/P EST MOD 30 MIN: CPT | Mod: S$GLB,,, | Performed by: STUDENT IN AN ORGANIZED HEALTH CARE EDUCATION/TRAINING PROGRAM

## 2024-07-01 PROCEDURE — 3008F BODY MASS INDEX DOCD: CPT | Mod: CPTII,S$GLB,, | Performed by: STUDENT IN AN ORGANIZED HEALTH CARE EDUCATION/TRAINING PROGRAM

## 2024-07-01 PROCEDURE — 1160F RVW MEDS BY RX/DR IN RCRD: CPT | Mod: CPTII,S$GLB,, | Performed by: STUDENT IN AN ORGANIZED HEALTH CARE EDUCATION/TRAINING PROGRAM

## 2024-07-01 PROCEDURE — 3044F HG A1C LEVEL LT 7.0%: CPT | Mod: CPTII,S$GLB,, | Performed by: STUDENT IN AN ORGANIZED HEALTH CARE EDUCATION/TRAINING PROGRAM

## 2024-07-01 PROCEDURE — 3079F DIAST BP 80-89 MM HG: CPT | Mod: CPTII,S$GLB,, | Performed by: STUDENT IN AN ORGANIZED HEALTH CARE EDUCATION/TRAINING PROGRAM

## 2024-07-01 PROCEDURE — 3075F SYST BP GE 130 - 139MM HG: CPT | Mod: CPTII,S$GLB,, | Performed by: STUDENT IN AN ORGANIZED HEALTH CARE EDUCATION/TRAINING PROGRAM

## 2024-07-01 PROCEDURE — 99999 PR PBB SHADOW E&M-EST. PATIENT-LVL IV: CPT | Mod: PBBFAC,,, | Performed by: STUDENT IN AN ORGANIZED HEALTH CARE EDUCATION/TRAINING PROGRAM

## 2024-07-01 PROCEDURE — 1159F MED LIST DOCD IN RCRD: CPT | Mod: CPTII,S$GLB,, | Performed by: STUDENT IN AN ORGANIZED HEALTH CARE EDUCATION/TRAINING PROGRAM

## 2024-07-01 RX ORDER — MECLIZINE HYDROCHLORIDE 25 MG/1
25 TABLET ORAL 3 TIMES DAILY PRN
Qty: 52 TABLET | Refills: 0 | Status: SHIPPED | OUTPATIENT
Start: 2024-07-01 | End: 2024-07-19

## 2024-07-01 NOTE — PROGRESS NOTES
[facility] Note    Chief Complaint   Left ear pain    History of Present Illness     Dot Coleman is a 63 y.o. female with HLD, h/o knee cap fracture (from the ground level (2 years ago) , PMS and mild depression, last seen in the clinic 2 weeks ago with viral prodrome , found to have left ear acute OM s/p doxycycline , left ear pain resolved but now with dizziness, nausea and ear fullness hence her visit today.      Problem List Addressed This Visit:    1. Dizziness  -     Ambulatory referral/consult to ENT; Future; Expected date: 07/08/2024  -     meclizine (ANTIVERT) 25 mg tablet; Take 1 tablet (25 mg total) by mouth 3 (three) times daily as needed for Dizziness.  Dispense: 52 tablet; Refill: 0    2. Labyrinthitis of left ear         Health Maintenance   Topic Date Due    Colorectal Cancer Screening  06/11/2025 (Originally 10/4/2007)    Mammogram  02/01/2025    Lipid Panel  05/24/2029    TETANUS VACCINE  06/12/2033    Hepatitis C Screening  Completed    Shingles Vaccine  Completed       No past medical history on file.    Past Surgical History:   Procedure Laterality Date    BREAST CYST ASPIRATION      TUBAL LIGATION         family history includes Breast cancer in her maternal aunt; Colon cancer in her father and maternal aunt.    Social History     Tobacco Use    Smoking status: Never    Smokeless tobacco: Never   Substance Use Topics    Alcohol use: Yes     Comment: rare    Drug use: Never       Review of Systems   Constitutional:  Positive for fatigue. Negative for fever.   HENT:  Negative for ear discharge, ear pain, hearing loss, rhinorrhea and sore throat.    Respiratory:  Negative for chest tightness and shortness of breath.    Cardiovascular:  Negative for chest pain.   Gastrointestinal:  Negative for abdominal pain, diarrhea and vomiting.   Genitourinary:  Negative for difficulty urinating, dysuria and frequency.   Musculoskeletal:  Positive for neck pain. Negative for  "arthralgias, back pain, gait problem and joint swelling.   Skin:  Positive for rash (upper back).   Neurological:  Positive for dizziness. Negative for seizures, weakness, numbness and headaches.   Psychiatric/Behavioral:  Negative for sleep disturbance.        Outpatient Encounter Medications as of 7/1/2024   Medication Sig Dispense Refill    estradioL (ESTRACE) 0.01 % (0.1 mg/gram) vaginal cream Place 1 g vaginally twice a week. 42.5 g 4    rosuvastatin (CRESTOR) 10 MG tablet Take 1 tablet (10 mg total) by mouth once daily. 90 tablet 3    triamcinolone acetonide 0.1% (KENALOG) 0.1 % ointment Apply topically 2 (two) times daily. 80 g 0    DULoxetine (CYMBALTA) 30 MG capsule Take 1 capsule (30 mg total) by mouth once daily. (Patient not taking: Reported on 7/1/2024) 30 capsule 11    meclizine (ANTIVERT) 25 mg tablet Take 1 tablet (25 mg total) by mouth 3 (three) times daily as needed for Dizziness. 52 tablet 0     No facility-administered encounter medications on file as of 7/1/2024.        Review of patient's allergies indicates:   Allergen Reactions    Orange Swelling    Penicillins Other (See Comments)     Shakes       Peanut Hives     unknown       Physical Exam      Vital Signs  Temp: 98.1 °F (36.7 °C)  Temp Source: Temporal  Pulse: 85  SpO2: 97 %  BP: 138/82  BP Location: Left arm  Patient Position: Sitting  Pain Score:   2  Pain Loc: Ear  Height and Weight  Height: 5' 1" (154.9 cm)  Weight: 68 kg (149 lb 14.6 oz)  BSA (Calculated - sq m): 1.71 sq meters  BMI (Calculated): 28.3  Weight in (lb) to have BMI = 25: 132]    Physical Exam  Vitals reviewed.   Constitutional:       Appearance: Normal appearance.   HENT:      Head: Normocephalic and atraumatic.      Right Ear: There is no impacted cerumen.      Left Ear: There is no impacted cerumen.      Ears:      Comments: Erythematous/opaque Left TM     Mouth/Throat:      Mouth: Mucous membranes are moist.      Pharynx: Oropharynx is clear.   Eyes:      Extraocular " "Movements: Extraocular movements intact.      Conjunctiva/sclera: Conjunctivae normal.      Pupils: Pupils are equal, round, and reactive to light.   Cardiovascular:      Rate and Rhythm: Normal rate and regular rhythm.      Pulses: Normal pulses.      Heart sounds: Normal heart sounds.   Pulmonary:      Effort: Pulmonary effort is normal.      Breath sounds: Normal breath sounds.   Abdominal:      General: Abdomen is flat. Bowel sounds are normal.      Palpations: Abdomen is soft.   Musculoskeletal:      Cervical back: Normal range of motion.      Right lower leg: No edema.      Left lower leg: No edema.   Lymphadenopathy:      Cervical: No cervical adenopathy.   Skin:     General: Skin is warm and dry.      Findings: Rash: hyperpigmented scaly rash on upper left back.   Neurological:      General: No focal deficit present.      Mental Status: She is alert and oriented to person, place, and time.      Sensory: No sensory deficit.   Psychiatric:         Mood and Affect: Mood normal.         Behavior: Behavior normal.          Laboratory:  CBC:  Recent Labs   Lab Result Units 05/24/24  1107   WBC K/uL 4.20   RBC M/uL 4.57   Hemoglobin g/dL 14.5   Hematocrit % 43.7   Platelets K/uL 184   MCV fL 96   MCH pg 31.7*   MCHC g/dL 33.2       CMP:  Recent Labs   Lab Result Units 05/24/24  1107 05/29/24  0904   Glucose mg/dL 85 62*   Calcium mg/dL 9.6 9.8   Albumin g/dL 3.8  --    Total Protein g/dL 7.0  --    Sodium mmol/L 140 142   Potassium mmol/L 4.0 4.1   CO2 mmol/L 24 26   Chloride mmol/L 106 109   BUN mg/dL 13 17   Alkaline Phosphatase U/L 99  --    ALT U/L 21  --    AST U/L 17  --    Total Bilirubin mg/dL 1.0  --        URINALYSIS:  No results for input(s): "COLORU", "CLARITYU", "SPECGRAV", "PHUR", "PROTEINUA", "GLUCOSEU", "BILIRUBINCON", "BLOODU", "WBCU", "RBCU", "BACTERIA", "MUCUS", "NITRITE", "LEUKOCYTESUR", "UROBILINOGEN", "HYALINECASTS" in the last 2160 hours.   LIPIDS:  Recent Labs   Lab Result Units " 05/24/24  1107   TSH uIU/mL 1.336   HDL mg/dL 52   Cholesterol mg/dL 180   Triglycerides mg/dL 77   LDL Cholesterol mg/dL 112.6   HDL/Cholesterol Ratio % 28.9   Non-HDL Cholesterol mg/dL 128   Total Cholesterol/HDL Ratio  3.5       TSH:  Recent Labs   Lab Result Units 05/24/24  1107   TSH uIU/mL 1.336       A1C:  Recent Labs   Lab Result Units 05/24/24  1107   Hemoglobin A1C % 5.2         Radiology:      Assessment/Plan     Dot Coleman is a 63 y.o.female with:    1. Dizziness  -likely labyrinthitis of the left ear given preceding viral URI Vs Eustachian tube dysfunction  -s/p doxycycline for acute OM  -c/w flonase /claritine daily   -     Ambulatory referral/consult to ENT; Future; Expected date: 07/08/2024  -     meclizine (ANTIVERT) 25 mg tablet; Take 1 tablet (25 mg total) by mouth 3 (three) times daily as needed for Dizziness.  Dispense: 52 tablet; Refill: 0    2. Labyrinthitis, left  -likely due to preceding viral UTI  -patient counseled on nature of illness, refer to Ent for further evaluation    3. Other hyperlipidemia  -well controlled on current regimen ( crestor)    4. Overweight with body mass index (BMI) of 27 to 27.9 in adult  -c/w life style modifications    5. H/o mild depression  -well controlled on current regimen    6. Osteopenia  -per recent DEXA(lumbar vertebrae)  -c/w Ca/Vit D supplements      -Continue current medications and maintain follow up with specialists.      Patient verbalizes understanding and agrees with current treatment plan.    Time spent: 30 minutes in face to face discussion concerning diagnosis, prognosis, review of lab and test results, benefits of treatment as well as management of disease, counseling of patient and coordination of care between various health care providers.  Greater than half the time spent was used for coordination of care and counseling of patient.      Sosa Carlos MD  [facility]

## 2024-07-05 ENCOUNTER — PATIENT MESSAGE (OUTPATIENT)
Dept: FAMILY MEDICINE | Facility: CLINIC | Age: 64
End: 2024-07-05
Payer: COMMERCIAL

## 2024-07-10 ENCOUNTER — CLINICAL SUPPORT (OUTPATIENT)
Dept: OTOLARYNGOLOGY | Facility: CLINIC | Age: 64
End: 2024-07-10
Payer: COMMERCIAL

## 2024-07-10 ENCOUNTER — OFFICE VISIT (OUTPATIENT)
Dept: OTOLARYNGOLOGY | Facility: CLINIC | Age: 64
End: 2024-07-10
Payer: COMMERCIAL

## 2024-07-10 VITALS
DIASTOLIC BLOOD PRESSURE: 93 MMHG | BODY MASS INDEX: 28.3 KG/M2 | SYSTOLIC BLOOD PRESSURE: 133 MMHG | HEART RATE: 44 BPM | WEIGHT: 149.81 LBS

## 2024-07-10 DIAGNOSIS — H90.12 CONDUCTIVE HEARING LOSS OF LEFT EAR WITH UNRESTRICTED HEARING OF RIGHT EAR: ICD-10-CM

## 2024-07-10 DIAGNOSIS — H91.8X2 OTHER SPECIFIED HEARING LOSS, LEFT EAR: ICD-10-CM

## 2024-07-10 DIAGNOSIS — R42 DIZZINESS: Primary | ICD-10-CM

## 2024-07-10 DIAGNOSIS — H66.92 LEFT OTITIS MEDIA, UNSPECIFIED OTITIS MEDIA TYPE: ICD-10-CM

## 2024-07-10 DIAGNOSIS — H92.02 LEFT EAR PAIN: ICD-10-CM

## 2024-07-10 PROCEDURE — 1160F RVW MEDS BY RX/DR IN RCRD: CPT | Mod: CPTII,S$GLB,, | Performed by: NURSE PRACTITIONER

## 2024-07-10 PROCEDURE — 3044F HG A1C LEVEL LT 7.0%: CPT | Mod: CPTII,S$GLB,, | Performed by: NURSE PRACTITIONER

## 2024-07-10 PROCEDURE — 99999 PR PBB SHADOW E&M-EST. PATIENT-LVL I: CPT | Mod: PBBFAC,,, | Performed by: PHYSICIAN ASSISTANT

## 2024-07-10 PROCEDURE — 99999 PR PBB SHADOW E&M-EST. PATIENT-LVL V: CPT | Mod: PBBFAC,,, | Performed by: NURSE PRACTITIONER

## 2024-07-10 PROCEDURE — 1159F MED LIST DOCD IN RCRD: CPT | Mod: CPTII,S$GLB,, | Performed by: NURSE PRACTITIONER

## 2024-07-10 PROCEDURE — 3079F DIAST BP 80-89 MM HG: CPT | Mod: CPTII,S$GLB,, | Performed by: NURSE PRACTITIONER

## 2024-07-10 PROCEDURE — 3008F BODY MASS INDEX DOCD: CPT | Mod: CPTII,S$GLB,, | Performed by: NURSE PRACTITIONER

## 2024-07-10 PROCEDURE — 3075F SYST BP GE 130 - 139MM HG: CPT | Mod: CPTII,S$GLB,, | Performed by: NURSE PRACTITIONER

## 2024-07-10 PROCEDURE — 99204 OFFICE O/P NEW MOD 45 MIN: CPT | Mod: S$GLB,,, | Performed by: NURSE PRACTITIONER

## 2024-07-10 NOTE — PROGRESS NOTES
Dot Coleman, a 63 y.o. female, was seen today in the clinic for an audiologic evaluation.  Ms. Coleman reported suffering with dizziness about one month ago and her symptoms have gradually improved. She started with left ear pain and congestion prior to onset of dizziness. She also reported clear drainage from her left ear at that time. She visited her PCP and was treated with medication.     Audiogram results revealed normal hearing sensitivity in the right ear and a mild hearing loss from 250-500 Hz and at 8k Hz in the left ear with a conductive component at 500 Hz.  Speech reception thresholds were noted at 5 dB in the right ear and 5 dB in the left ear.  Speech discrimination scores were 100% in the right ear and 100% in the left ear.  Tympanometry revealed Type As in the right ear and Type B in the left ear.     Recommendations:  Otologic evaluation  Annual audiogram  Hearing protection when in noise

## 2024-07-10 NOTE — PROGRESS NOTES
"  Chief Complaint   Patient presents with    Dizziness    Otalgia     "Off and on" mainly left ear.        HPI:   The patient who is referred to me by Dr. Sosa Spann* in consultation for evaluation of dizziness. The symptoms started about a month ago and have gradually improved. It started out as a upper respiratory infection and left ear infection prior to onset of dizziness. She felt pain in the left ear and noticed drainage when it happened. She was treated with doxycycline on 6/17/2024 by her PCP.   The sensation is also described as: spinning sensation in the beginning and now it's lightheadedness.The attacks occur several times per week and last 1  hour . Positions that worsen symptoms: standing up in the morning.  Associated ear symptoms: otalgia. Associated CNS symptoms: none. Recent infections: ear infection and upper respiratory infection. Head trauma: denied. Drug ingestion prior to onset: none. Noise exposure: no occupational exposure.Previous workup: none. Previous treatment includes: meclizine    Patient denies a history of migraine headaches. No previous history of ear infections.  She also c/o sore throat at night time when she lays flat. She denies nasal congestion, runny nose,  acid reflux. She is currently on Flonase.         No past medical history on file.  Social History     Socioeconomic History    Marital status:    Tobacco Use    Smoking status: Never    Smokeless tobacco: Never   Substance and Sexual Activity    Alcohol use: Yes     Comment: rare    Drug use: Never    Sexual activity: Not Currently     Partners: Male     Birth control/protection: See Surgical Hx     Social Determinants of Health     Financial Resource Strain: Low Risk  (5/23/2024)    Overall Financial Resource Strain (CARDIA)     Difficulty of Paying Living Expenses: Not hard at all   Food Insecurity: No Food Insecurity (5/23/2024)    Hunger Vital Sign     Worried About Running Out of Food in the Last Year: " Never true     Ran Out of Food in the Last Year: Never true   Physical Activity: Sufficiently Active (5/23/2024)    Exercise Vital Sign     Days of Exercise per Week: 5 days     Minutes of Exercise per Session: 60 min   Stress: No Stress Concern Present (5/23/2024)    [employer] - Occupational Stress Questionnaire     Feeling of Stress : Not at all   Housing Stability: Unknown (5/23/2024)    Housing Stability Vital Sign     Unable to Pay for Housing in the Last Year: No     Past Surgical History:   Procedure Laterality Date    BREAST CYST ASPIRATION      TUBAL LIGATION       Family History   Problem Relation Name Age of Onset    Colon cancer Father      Breast cancer Maternal Aunt Morenita     Colon cancer Maternal Aunt Mirna     Ovarian cancer Neg Hx             Review of Systems  General: negative for chills, fever or weight loss  Psychological: negative for mood changes or depression  Ophthalmic: negative for blurry vision, photophobia or eye pain  ENT: see HPI  Respiratory: no cough, shortness of breath, or wheezing  Cardiovascular: no chest pain or dyspnea on exertion  Gastrointestinal: no abdominal pain, change in bowel habits, or black/ bloody stools  Musculoskeletal: negative for gait disturbance or muscular weakness  Neurological: no syncope or seizures; no ataxia  Dermatological: negative for pruritis,  rash and jaundice  Hematologic/lymphatic: no easy bruising, no new adenopathy    Physical Exam:    Vitals:    07/10/24 1036   BP: (!) 133/93   Pulse: (!) 44       Constitutional: Well appearing / communicating without difficutly.  NAD.  Eyes: EOM I Bilaterally  Head/Face: Normocephalic.  Negative paranasal sinus pressure/tenderness.  Salivary glands WNL.  House Brackmann I Bilaterally.    Right Ear: Auricle normal appearance. External Auditory Canal within normal limits no lesions or masses,TM w/o masses/lesions/perforations. TM mobility noted.   Left Ear: Auricle normal  appearance. External Auditory Canal within normal limits no lesions or masses,TM w/o masses/lesions/perforations. TM mobility noted.  Vestibular exam: negativehead thrust; negative Fukuda step test; negative Romberg; negative Right Dixx- Hallpike; negative left Dixx- Hallpike  Nose: No gross nasal septal deviation. Inferior Turbinates 3+ bilaterally. No septal perforation. No masses/lesions. External nasal skin appears normal without masses/lesions.  Oral Cavity: Gingiva/lips within normal limits.  Dentition/gingiva healthy appearing. Mucus membranes moist. Floor of mouth soft, no masses palpated. Oral Tongue mobile. Hard Palate appears normal.    Oropharynx: Base of tongue appears normal. No masses/lesions noted. Tonsillar fossa/pharyngeal wall without lesions. Posterior oropharynx WNL.  Soft palate without masses. Midline uvula.   Neck/Lymphatic: No LAD I-VI bilaterally.  No thyromegaly.  No masses noted on exam.    Mirror laryngoscopy/nasopharyngoscopy: Active gag reflex.  Unable to perform.    Neuro/Psychiatric: AOx3.  Normal mood and affect.   Cardiovascular: Normal carotid pulses bilaterally, no increasing jugular venous distention noted at cervical region bilaterally.    Respiratory: Normal respiratory effort, no stridor, no retractions noted.      Audiogram: Reviewed and interpreted by me, and discussed with the patient today.    Audiogram results revealed normal hearing sensitivity in the right ear and a mild hearing loss from 250-500 Hz and at 8k Hz in the left ear with a conductive component at 500 Hz.  Speech reception thresholds were noted at 5 dB in the right ear and 5 dB in the left ear.  Speech discrimination scores were 100% in the right ear and 100% in the left ear.  Tympanometry revealed Type As in the right ear and Type B in the left ear.         Assessment:    ICD-10-CM ICD-9-CM    1. Dizziness  R42 780.4 Ambulatory referral/consult to ENT      2. Conductive hearing loss of left ear with  unrestricted hearing of right ear  H90.12 389.05       3. Left ear pain  H92.02 388.70       4. Left otitis media -resolved  H66.92 382.9         The primary encounter diagnosis was Dizziness. Diagnoses of Conductive hearing loss of left ear with unrestricted hearing of right ear, Left ear pain, and Left otitis media -resolved were also pertinent to this visit.      Plan:  No orders of the defined types were placed in this encounter.    -vestibular exam negative today  -otoscopic exam revealed dry blood and scarring of the left ear drum. Based on her history and examination, I suspect that she had a perforated ear drum which is healing well. No signs of infection today  -I had a long discussion with the patient regarding their symptoms.  Discussed various factors related to dizziness and imbalance including: cardiovascular and vestibular. She reports dizziness has improved since her infection has resolved.   -continue with Flonase and recommended taking Zyrtec daily. If sore throat does not improve with next visit, would consider scoping her.  -follow up 4-6 weeks with repeat audiogram      Niurka Rehman NP        Answers submitted by the patient for this visit:  Review of Symptoms Questionnaire  (Submitted on 7/10/2024)  None of these: Yes  Ear infection(s)?: Yes  ear discharge: Yes  ear pain: Yes  sore throat: Yes  None of these : Yes  None of these: Yes  None of these : Yes  None of these: Yes  None of these: Yes  None of these: Yes  None of these : Yes  Food Allergies?: Yes  None of these : Yes  dizziness: Yes  headaches: Yes  Light-headedness: Yes  None of these: Yes  None of these: Yes

## 2024-07-10 NOTE — PATIENT INSTRUCTIONS
Eustachian tube precautions for flying    Yawn and swallow during ascent and descent. These activate the muscles that open your eustachian tubes. You can suck on candy or chew gum to help you swallow.  Use the Valsalva maneuver during ascent and descent. Gently blow, as if blowing your nose, while pinching your nostrils and keeping your mouth closed. Repeat several times, especially during descent, to equalize the pressure between your ears and the airplane cabin.    Don't sleep during takeoffs and landings. If you're awake during ascents and descents, you can do the necessary self-care techniques when you feel pressure in your ears.    Reconsider travel plans. If possible, don't fly when you have a cold, a sinus infection, nasal congestion or an ear infection. If you've recently had ear surgery, talk to your doctor about when it's safe to travel.    Use an over-the-counter nasal decongestant spray. If you have nasal congestion, use a nasal spray about 30 minutes to an hour before takeoff and landing. Avoid overuse, however, because nasal sprays taken over three to four days can increase congestion.    Use decongestant pills cautiously. Decongestants taken by mouth might help if taken 30 minutes to an hour before an airplane flight. However, if you have heart disease, a heart rhythm disorder or high blood pressure or you're pregnant, avoid taking an oral decongestant.    Take allergy medication. If you have allergies, take your medication about an hour before your flight.    Try filtered earplugs. These earplugs slowly equalize the pressure against your eardrum during ascents and descents.   You can purchase these at [employer], airport [employer] shops or a hearing clinic. However, you'll still need to yawn and swallow to relieve pressure.

## 2024-07-19 ENCOUNTER — PATIENT MESSAGE (OUTPATIENT)
Dept: FAMILY MEDICINE | Facility: CLINIC | Age: 64
End: 2024-07-19
Payer: COMMERCIAL

## 2024-08-01 DIAGNOSIS — L20.89 OTHER ATOPIC DERMATITIS: ICD-10-CM

## 2024-08-01 DIAGNOSIS — E78.49 OTHER HYPERLIPIDEMIA: ICD-10-CM

## 2024-08-01 RX ORDER — TRIAMCINOLONE ACETONIDE 1 MG/G
OINTMENT TOPICAL 2 TIMES DAILY
Qty: 30 G | Refills: 0 | Status: SHIPPED | OUTPATIENT
Start: 2024-08-01

## 2024-08-01 RX ORDER — ROSUVASTATIN CALCIUM 10 MG/1
10 TABLET, COATED ORAL DAILY
Qty: 90 TABLET | Refills: 3 | Status: SHIPPED | OUTPATIENT
Start: 2024-08-01

## 2024-08-01 NOTE — TELEPHONE ENCOUNTER
No care due was identified.  [facility] Embedded Care Due Messages. Reference number: [id number].   8/01/2024 10:39:57 AM CDT

## 2024-08-01 NOTE — TELEPHONE ENCOUNTER
Refill Decision Note   Dot Jared  is requesting a refill authorization.  Brief Assessment and Rationale for Refill:  Approve     Medication Therapy Plan:         Comments:     Note composed:4:34 PM 08/01/2024

## 2024-08-11 ENCOUNTER — PATIENT MESSAGE (OUTPATIENT)
Dept: OTOLARYNGOLOGY | Facility: CLINIC | Age: 64
End: 2024-08-11
Payer: COMMERCIAL

## 2024-08-13 ENCOUNTER — OFFICE VISIT (OUTPATIENT)
Dept: OTOLARYNGOLOGY | Facility: CLINIC | Age: 64
End: 2024-08-13
Payer: COMMERCIAL

## 2024-08-13 VITALS
BODY MASS INDEX: 28.28 KG/M2 | WEIGHT: 149.69 LBS | SYSTOLIC BLOOD PRESSURE: 129 MMHG | DIASTOLIC BLOOD PRESSURE: 77 MMHG | HEART RATE: 70 BPM

## 2024-08-13 DIAGNOSIS — H92.02 LEFT EAR PAIN: Primary | ICD-10-CM

## 2024-08-13 DIAGNOSIS — M26.622 ARTHRALGIA OF LEFT TEMPOROMANDIBULAR JOINT: ICD-10-CM

## 2024-08-13 PROCEDURE — 3074F SYST BP LT 130 MM HG: CPT | Mod: CPTII,S$GLB,, | Performed by: NURSE PRACTITIONER

## 2024-08-13 PROCEDURE — 99999 PR PBB SHADOW E&M-EST. PATIENT-LVL III: CPT | Mod: PBBFAC,,, | Performed by: NURSE PRACTITIONER

## 2024-08-13 PROCEDURE — 1159F MED LIST DOCD IN RCRD: CPT | Mod: CPTII,S$GLB,, | Performed by: NURSE PRACTITIONER

## 2024-08-13 PROCEDURE — 3044F HG A1C LEVEL LT 7.0%: CPT | Mod: CPTII,S$GLB,, | Performed by: NURSE PRACTITIONER

## 2024-08-13 PROCEDURE — 1160F RVW MEDS BY RX/DR IN RCRD: CPT | Mod: CPTII,S$GLB,, | Performed by: NURSE PRACTITIONER

## 2024-08-13 PROCEDURE — 3008F BODY MASS INDEX DOCD: CPT | Mod: CPTII,S$GLB,, | Performed by: NURSE PRACTITIONER

## 2024-08-13 PROCEDURE — 99213 OFFICE O/P EST LOW 20 MIN: CPT | Mod: S$GLB,,, | Performed by: NURSE PRACTITIONER

## 2024-08-13 PROCEDURE — 3078F DIAST BP <80 MM HG: CPT | Mod: CPTII,S$GLB,, | Performed by: NURSE PRACTITIONER

## 2024-08-13 NOTE — PROGRESS NOTES
Chief Complaint   Patient presents with    Follow-up    Ear Problem     Pain in left ear        HPI 7/10/2024:   The patient who is referred to me by Dr. Sosa Spann* in consultation for evaluation of dizziness. The symptoms started about a month ago and have gradually improved. It started out as a upper respiratory infection and left ear infection prior to onset of dizziness. She felt pain in the left ear and noticed drainage when it happened. She was treated with doxycycline on 6/17/2024 by her PCP.   The sensation is also described as: spinning sensation in the beginning and now it's lightheadedness.The attacks occur several times per week and last 1  hour . Positions that worsen symptoms: standing up in the morning.  Associated ear symptoms: otalgia. Associated CNS symptoms: none. Recent infections: ear infection and upper respiratory infection. Head trauma: denied. Drug ingestion prior to onset: none. Noise exposure: no occupational exposure.Previous workup: none. Previous treatment includes: meclizine    Patient denies a history of migraine headaches. No previous history of ear infections.  She also c/o sore throat at night time when she lays flat. She denies nasal congestion, runny nose,  acid reflux. She is currently on Flonase.     Interval HPI 8/13/2024:  Follow up visit. She reports improvement with her sore throat and dizziness. She reports of left ear pain. She describes it as a sharp pain that comes and goes and only lasts about a second. No report of ear drainage or hearing loss. She admits to chewing gum. Denies grinding teeth.   She reports sleeping on the left side.   She is currently on Flonase and Zyrtec. Denies nasal congestion and runny nose.     History reviewed. No pertinent past medical history.  Social History     Socioeconomic History    Marital status:    Tobacco Use    Smoking status: Never    Smokeless tobacco: Never   Substance and Sexual Activity    Alcohol use: Yes      Comment: rare    Drug use: Never    Sexual activity: Not Currently     Partners: Male     Birth control/protection: See Surgical Hx     Social Determinants of Health     Financial Resource Strain: Low Risk  (5/23/2024)    Overall Financial Resource Strain (CARDIA)     Difficulty of Paying Living Expenses: Not hard at all   Food Insecurity: No Food Insecurity (5/23/2024)    Hunger Vital Sign     Worried About Running Out of Food in the Last Year: Never true     Ran Out of Food in the Last Year: Never true   Physical Activity: Sufficiently Active (5/23/2024)    Exercise Vital Sign     Days of Exercise per Week: 5 days     Minutes of Exercise per Session: 60 min   Stress: No Stress Concern Present (5/23/2024)    [employer] [place] [place] [employer] - Occupational Stress Questionnaire     Feeling of Stress : Not at all   Housing Stability: Unknown (5/23/2024)    Housing Stability Vital Sign     Unable to Pay for Housing in the Last Year: No     Past Surgical History:   Procedure Laterality Date    BREAST CYST ASPIRATION      TUBAL LIGATION       Family History   Problem Relation Name Age of Onset    Colon cancer Father      Breast cancer Maternal Aunt Morenita     Colon cancer Maternal Aunt Marlette     Ovarian cancer Neg Hx             Review of Systems  General: negative for chills, fever or weight loss  Psychological: negative for mood changes or depression  Ophthalmic: negative for blurry vision, photophobia or eye pain  ENT: see HPI  Respiratory: no cough, shortness of breath, or wheezing  Cardiovascular: no chest pain or dyspnea on exertion  Gastrointestinal: no abdominal pain, change in bowel habits, or black/ bloody stools  Musculoskeletal: negative for gait disturbance or muscular weakness  Neurological: no syncope or seizures; no ataxia  Dermatological: negative for pruritis,  rash and jaundice  Hematologic/lymphatic: no easy bruising, no new adenopathy    Physical Exam:    Vitals:    08/13/24 0840   BP:  129/77   Pulse: 70         Constitutional: Well appearing / communicating without difficutly.  NAD.  Eyes: EOM I Bilaterally  Head/Face: Normocephalic.  Negative paranasal sinus pressure/tenderness.  Salivary glands WNL.  House Brackmann I Bilaterally.    Right Ear: Auricle normal appearance. External Auditory Canal within normal limits no lesions or masses,TM w/o masses/lesions/perforations.   Left Ear: Auricle normal appearance. External Auditory Canal within normal limits no lesions or masses,TM w/o masses/lesions/perforations.   Nose: No gross nasal septal deviation. Inferior Turbinates 3+ bilaterally. No septal perforation. No masses/lesions. External nasal skin appears normal without masses/lesions.  Oral Cavity: Gingiva/lips within normal limits.  Dentition/gingiva healthy appearing. Mucus membranes moist. Floor of mouth soft, no masses palpated. Oral Tongue mobile. Hard Palate appears normal.    Oropharynx: Base of tongue appears normal. No masses/lesions noted. Tonsillar fossa/pharyngeal wall without lesions. Posterior oropharynx WNL.  Soft palate without masses. Midline uvula.   Neck/Lymphatic: No LAD I-VI bilaterally.  No thyromegaly.  No masses noted on exam.    Mirror laryngoscopy/nasopharyngoscopy: Active gag reflex.  Unable to perform.    Neuro/Psychiatric: AOx3.  Normal mood and affect.   Cardiovascular: Normal carotid pulses bilaterally, no increasing jugular venous distention noted at cervical region bilaterally.    Respiratory: Normal respiratory effort, no stridor, no retractions noted.            Assessment:    ICD-10-CM ICD-9-CM    1. Left ear pain  H92.02 388.70       2. Arthralgia of left temporomandibular joint  M26.622 524.62           The primary encounter diagnosis was Left ear pain. A diagnosis of Arthralgia of left temporomandibular joint was also pertinent to this visit.      Plan:  No orders of the defined types were placed in this encounter.    -otoscopic exam benign. She didn't want  an audiogram today  -We had a long discussion regarding the underlying pathology of temporomandibular joint dysfunction (TMD) as the cause of ear pain.  We further discussed conservative measures to treat TMD including avoiding gum and other foods that require lots of chewing, warm compresses, and scheduled antinflammatories (such as Motrin, Ibuprofen, or Aleve).  The patient should also wear a  (purchased OTC or see dentist for custom), which prevents additional pressure on the TM joint.  Stress can also exacerbate TMJ symptoms.    If the pain persists, the patient will then schedule an appointment with a dentist for further evaluation.  -she will notify in 2 weeks after her trip to [place] if pain still persists. Will order CT temporal if no improvement.   -continue with Flonase and Zyrtec.   -follow up 1 year or sooner as needed      Niurka Rehman NP        Answers submitted by the patient for this visit:  Review of Symptoms Questionnaire  (Submitted on 8/13/2024)  None of these: Yes  Ear infection(s)?: Yes  ear pain: Yes  None of these : Yes  None of these: Yes  None of these : Yes  None of these: Yes  None of these: Yes  None of these: Yes  None of these : Yes  None of these: Yes  None of these: Yes  None of these: Yes  None of these: Yes

## 2025-01-30 ENCOUNTER — OFFICE VISIT (OUTPATIENT)
Dept: OBSTETRICS AND GYNECOLOGY | Facility: CLINIC | Age: 65
End: 2025-01-30
Payer: COMMERCIAL

## 2025-01-30 VITALS
DIASTOLIC BLOOD PRESSURE: 83 MMHG | BODY MASS INDEX: 27.91 KG/M2 | SYSTOLIC BLOOD PRESSURE: 119 MMHG | WEIGHT: 147.69 LBS

## 2025-01-30 DIAGNOSIS — Z12.4 CERVICAL CANCER SCREENING: ICD-10-CM

## 2025-01-30 DIAGNOSIS — N95.2 VAGINAL ATROPHY: ICD-10-CM

## 2025-01-30 DIAGNOSIS — Z01.419 ROUTINE GYNECOLOGICAL EXAMINATION: Primary | ICD-10-CM

## 2025-01-30 DIAGNOSIS — Z11.3 SCREENING EXAMINATION FOR STD (SEXUALLY TRANSMITTED DISEASE): ICD-10-CM

## 2025-01-30 PROCEDURE — 87491 CHLMYD TRACH DNA AMP PROBE: CPT | Performed by: OBSTETRICS & GYNECOLOGY

## 2025-01-30 PROCEDURE — 99999 PR PBB SHADOW E&M-EST. PATIENT-LVL III: CPT | Mod: PBBFAC,,, | Performed by: OBSTETRICS & GYNECOLOGY

## 2025-01-30 PROCEDURE — 99396 PREV VISIT EST AGE 40-64: CPT | Mod: S$GLB,,, | Performed by: OBSTETRICS & GYNECOLOGY

## 2025-01-30 PROCEDURE — 1159F MED LIST DOCD IN RCRD: CPT | Mod: CPTII,S$GLB,, | Performed by: OBSTETRICS & GYNECOLOGY

## 2025-01-30 PROCEDURE — 3008F BODY MASS INDEX DOCD: CPT | Mod: CPTII,S$GLB,, | Performed by: OBSTETRICS & GYNECOLOGY

## 2025-01-30 PROCEDURE — 88175 CYTOPATH C/V AUTO FLUID REDO: CPT | Performed by: OBSTETRICS & GYNECOLOGY

## 2025-01-30 PROCEDURE — 3074F SYST BP LT 130 MM HG: CPT | Mod: CPTII,S$GLB,, | Performed by: OBSTETRICS & GYNECOLOGY

## 2025-01-30 PROCEDURE — 3079F DIAST BP 80-89 MM HG: CPT | Mod: CPTII,S$GLB,, | Performed by: OBSTETRICS & GYNECOLOGY

## 2025-01-30 RX ORDER — ESTRADIOL 0.1 MG/G
1 CREAM VAGINAL
Qty: 42.5 G | Refills: 4 | Status: SHIPPED | OUTPATIENT
Start: 2025-01-30 | End: 2026-01-30

## 2025-01-30 NOTE — PROGRESS NOTES
65 yo postmenopausal female who presents for routine gyn visit.  No gyn complaints.hot flashes have improved over the years.  Continues to have vaginal dryness which she uses vaginal estrogen for and desires refills.  No episodes of PMB.  . May start new relationship soon. Ok with std testing.  Family h/o colon cancer.    ROS:  GENERAL: Denies weight gain or weight loss. Feeling well overall.   SKIN: Denies rash or lesions.   HEAD: Denies head injury or headache.   CHEST: Denies chest pain or shortness of breath.   CARDIOVASCULAR: Denies palpitations or left sided chest pain.   ABDOMEN: No abdominal pain, constipation, diarrhea, nausea, vomiting or rectal bleeding.   URINARY: No frequency, dysuria, hematuria, or burning on urination.  REPRODUCTIVE: See HPI.   BREASTS: denies pain, lumps, or nipple discharge.   HEMATOLOGIC: No easy bruisability or excessive bleeding.   MUSCULOSKELETAL: Denies joint pain or swelling.   NEUROLOGIC: Denies syncope or weakness.   PSYCHIATRIC: Denies depression, anxiety or mood swings.       PE:   Vitals: /83   Wt 67 kg (147 lb 11.3 oz)   BMI 27.91 kg/m²   APPEARANCE: Well nourished, well developed, in no acute distress.  SKIN: Normal skin turgor, no lesions.  ABDOMEN: Soft. No tenderness or masses. No hepatosplenomegaly. No hernias.  BREASTS: Symmetrical, no skin changes or visible lesions. No palpable masses, nipple discharge or adenopathy bilaterally.  PELVIC: Normal external female genitalia without lesions. Normal hair distribution. Adequate perineal body, normal urethral meatus. Vagina moist and well rugated without lesions or discharge. Cervix pink and without lesions. No significant cystocele or rectocele. Bimanual exam showed uterus normal size, shape, position, mobile and nontender. Adnexa without masses or tenderness. Urethra and bladder normal.        AP  Routine gyn  -s/p normal breast exam: mammogram scheduled  -s/p normal pelvic exam:   -Pap collected  -STD  testing: collected  -colon cancer screeing:   every 5 yrs; due 2025 (will plan with PCP)  -rx for estrace cream sent  -provided with info about daya Lopez MD

## 2025-01-30 NOTE — PATIENT INSTRUCTIONS
Revaree  Hyaluronic Acid Vaginal Dryness Suppository    [place]   [email or web address] products  revaree  Revaree is drug- & hormone-free, formulated with naturally derived Hyaluronic acid that safely hydrates & renews vaginal tissue, for lasting relief.  4.5(3,114) · $47.88 to $63.00 · ?$5 4-9 day delivery · ?30-day returns · ?In stock  Save 24% on Subscriptions · Ends Apr 10

## 2025-01-31 LAB
CLINICAL INFO: NORMAL
DATE OF PREVIOUS PAP: NORMAL
DATE PREVIOUS BX: NORMAL
LMP START DATE: NORMAL
SPECIMEN SOURCE CVX/VAG CYTO: NORMAL

## 2025-02-18 ENCOUNTER — RESULTS FOLLOW-UP (OUTPATIENT)
Dept: OTOLARYNGOLOGY | Facility: CLINIC | Age: 65
End: 2025-02-18

## 2025-02-21 ENCOUNTER — TELEPHONE (OUTPATIENT)
Dept: FAMILY MEDICINE | Facility: CLINIC | Age: 65
End: 2025-02-21
Payer: COMMERCIAL

## 2025-02-21 ENCOUNTER — OFFICE VISIT (OUTPATIENT)
Dept: FAMILY MEDICINE | Facility: CLINIC | Age: 65
End: 2025-02-21
Payer: COMMERCIAL

## 2025-02-21 VITALS
SYSTOLIC BLOOD PRESSURE: 137 MMHG | TEMPERATURE: 98 F | BODY MASS INDEX: 28.39 KG/M2 | WEIGHT: 150.38 LBS | HEIGHT: 61 IN | OXYGEN SATURATION: 98 % | DIASTOLIC BLOOD PRESSURE: 88 MMHG | HEART RATE: 99 BPM

## 2025-02-21 DIAGNOSIS — J32.0 MAXILLARY SINUSITIS, UNSPECIFIED CHRONICITY: ICD-10-CM

## 2025-02-21 DIAGNOSIS — H66.92 LEFT OTITIS MEDIA, UNSPECIFIED OTITIS MEDIA TYPE: Primary | ICD-10-CM

## 2025-02-21 PROCEDURE — 99999 PR PBB SHADOW E&M-EST. PATIENT-LVL IV: CPT | Mod: PBBFAC,,, | Performed by: NURSE PRACTITIONER

## 2025-02-21 RX ORDER — DOXYCYCLINE 100 MG/1
100 CAPSULE ORAL 2 TIMES DAILY
Qty: 20 CAPSULE | Refills: 0 | Status: SHIPPED | OUTPATIENT
Start: 2025-02-21

## 2025-02-21 RX ORDER — FLUTICASONE PROPIONATE 50 MCG
1 SPRAY, SUSPENSION (ML) NASAL DAILY
Qty: 16 G | Refills: 0 | Status: SHIPPED | OUTPATIENT
Start: 2025-02-21

## 2025-02-21 RX ORDER — METHYLPREDNISOLONE 4 MG/1
TABLET ORAL
Qty: 21 EACH | Refills: 0 | Status: SHIPPED | OUTPATIENT
Start: 2025-02-21 | End: 2025-03-14

## 2025-02-21 NOTE — PROGRESS NOTES
Patient ID: Dot Coleman is a 64 y.o. female.     Chief Complaint: Otalgia (L ear )      HPI:  History of Present Illness    CHIEF COMPLAINT:  Patient presents today for cough, congestion, and left ear pain.    HISTORY OF PRESENT ILLNESS:  She presents with a dry hacking cough, worse at night and when lying down, accompanied by congestion with clear nasal discharge and wheezing noted particularly last night. She denies shortness of breath, fever, body aches, chills, or night sweats. Left ear pain started last night and radiates down into the neck. She reports recent exposure to grandchildren with colds during a trip to [place] last week.    ENT HISTORY:  She had an ear infection in August requiring ENT referral that took time to resolve. She currently denies dizziness.    MEDICAL HISTORY:  She has a history of childhood asthma which resolved in young adulthood.    ALLERGIES:  She has a documented allergy to penicillin.    CURRENT MEDICATIONS:  She is taking Zyrtec and Mucinex for symptom management.          Review of Systems   Constitutional:  Negative for chills, fever, malaise/fatigue and weight loss.   HENT:  Positive for congestion, ear pain and sinus pain. Negative for sore throat.    Eyes:  Negative for blurred vision, double vision and photophobia.   Respiratory:  Positive for cough. Negative for shortness of breath and wheezing.    Cardiovascular:  Negative for chest pain, palpitations, orthopnea and leg swelling.   Gastrointestinal:  Negative for abdominal pain, constipation, diarrhea, nausea and vomiting.   Genitourinary:  Negative for dysuria, frequency and urgency.   Musculoskeletal:  Negative for back pain, joint pain and neck pain.   Skin:  Negative for rash.   Neurological:  Negative for dizziness, weakness and headaches.   Endo/Heme/Allergies:  Negative for polydipsia.   Psychiatric/Behavioral:  Negative for depression. The patient is not nervous/anxious and does not have insomnia.      "    Currently Medications  Medications Ordered Prior to Encounter[1]    Allergies  Review of patient's allergies indicates:   Allergen Reactions    Orange Swelling    Penicillins Other (See Comments)     Shakes       Peanut Hives     unknown        Health Maintenance  Health Maintenance Topics with due status: Not Due       Topic Last Completion Date    TETANUS VACCINE 06/12/2023    Hemoglobin A1c (Diabetic Prevention Screening) 05/24/2024    Lipid Panel 05/24/2024    Cervical Cancer Screening 01/30/2025    Mammogram 02/17/2025    RSV Vaccine (Age 60+ and Pregnant patients) Not Due          PMH:  History reviewed. No pertinent past medical history.   Past Surgical History:   Procedure Laterality Date    BREAST CYST ASPIRATION      TUBAL LIGATION            Physical  Exam  Vitals:    02/21/25 1519   BP: 137/88   BP Location: Right arm   Patient Position: Sitting   Pulse: 99   Temp: 97.9 °F (36.6 °C)   SpO2: 98%   Weight: 68.2 kg (150 lb 5.7 oz)   Height: 5' 1" (1.549 m)      Body mass index is 28.41 kg/m².  Wt Readings from Last 3 Encounters:   02/21/25 68.2 kg (150 lb 5.7 oz)   02/17/25 66.7 kg (147 lb)   01/30/25 67 kg (147 lb 11.3 oz)       Physical Exam  Vitals and nursing note reviewed.   Constitutional:       Appearance: Normal appearance.   HENT:      Head: Normocephalic and atraumatic.      Right Ear: Ear canal normal. A middle ear effusion is present.      Left Ear: A middle ear effusion is present. Tympanic membrane is erythematous and bulging.      Nose: Congestion and rhinorrhea present. Rhinorrhea is clear.      Right Sinus: Maxillary sinus tenderness present.      Left Sinus: Maxillary sinus tenderness present.      Mouth/Throat:      Lips: Pink.      Mouth: Mucous membranes are moist.      Pharynx: Posterior oropharyngeal erythema and postnasal drip present. No oropharyngeal exudate.   Eyes:      Pupils: Pupils are equal, round, and reactive to light.   Cardiovascular:      Rate and Rhythm: Normal rate " and regular rhythm.      Pulses: Normal pulses.      Heart sounds: Normal heart sounds.   Pulmonary:      Effort: Pulmonary effort is normal.      Breath sounds: Normal breath sounds.   Abdominal:      General: Bowel sounds are normal.      Palpations: Abdomen is soft.   Musculoskeletal:         General: Normal range of motion.   Lymphadenopathy:      Head:      Right side of head: No submandibular or tonsillar adenopathy.      Left side of head: No submandibular or tonsillar adenopathy.      Cervical: No cervical adenopathy.   Skin:     General: Skin is warm and dry.      Capillary Refill: Capillary refill takes less than 2 seconds.   Neurological:      Mental Status: She is alert and oriented to person, place, and time.   Psychiatric:         Mood and Affect: Mood normal.              Assessment/Plan:  1. Left otitis media, unspecified otitis media type  - doxycycline (VIBRAMYCIN) 100 MG Cap; Take 1 capsule (100 mg total) by mouth 2 (two) times daily.  Dispense: 20 capsule; Refill: 0    2. Maxillary sinusitis, unspecified chronicity  - fluticasone propionate (FLONASE) 50 mcg/actuation nasal spray; 1 spray (50 mcg total) by Each Nostril route once daily.  Dispense: 16 g; Refill: 0  - methylPREDNISolone (MEDROL DOSEPACK) 4 mg tablet; use as directed  Dispense: 21 each; Refill: 0       Assessment & Plan    IMPRESSION:  - Diagnosed left ear infection, likely secondary to sinus congestion and recent air travel  - Determined patient's cough likely due to post-nasal drip progressing to more severe symptoms  - Considered patient's history of recent viral exposure from grandchildren and air travel as contributing factors  - Assessed lung sounds, confirming clear breath sounds without current wheezing  - Will treat with antibiotics and steroids, similar to previous successful treatment in August    EAR INFECTION:  - Examined the patient's left ear and found it to be infected, red, and inflamed.  - Noted that the patient  reports left ear pain that started last night, with a previous ear infection in August that took time to clear up.  - Explained the connection between sinus congestion, eustachian tube blockage, and ear infections.  - Prescribed antibiotics and steroids, similar to previous treatment.      SINUS CONGESTION:  - Recommend using nasal sprays and saline rinses to prevent future infections.  - Instructed on proper nasal spray technique, emphasizing tilting the bottle towards the eye for optimal sinus cavity delivery.  - Advised on saline rinse method in the shower for thorough sinus cleansing.  - Prescribed Flonase nasal spray.  - Advised the patient to use saline nasal spray or rinse regularly, especially at early signs of congestion or dry cough.    MEDICATIONS/SUPPLEMENTS:  - Prescribed doxycycline.  - Initiated steroid Dosepak: Instructed to take half of day 1 dose today (near supper time), then follow regular dosing schedule starting tomorrow.  - Continued Zyrtec daily.  - Recommend OTC multi-symptom cold medication with expectorant and decongestant for a few days (e.g., Mucinex multi-symptom cold).    ASTHMA:  - Noted patient's history of asthma in young adulthood. Currently stable.     PENICILLIN ALLERGY:  - Confirmed patient's allergy to penicillin and all medications in the cillin family.  - Avoided prescribing penicillin-based antibiotics due to allergy.    FOLLOW UP:  - Advised to follow up as needed if symptoms do not improve.  - Instructed the patient to keep the office updated on condition.              Follow up if symptoms worsen or fail to improve.       This note was generated with the assistance of ambient listening technology. Verbal consent was obtained by the patient and accompanying visitor(s) for the recording of patient appointment to facilitate this note. I attest to having reviewed and edited the generated note for accuracy, though some syntax or spelling errors may persist. Please contact the  author of this note for any clarification.         Michelle Caldwell NP  [facility]   02/21/2025            [1]   Current Outpatient Medications on File Prior to Visit   Medication Sig Dispense Refill    estradioL (ESTRACE) 0.01 % (0.1 mg/gram) vaginal cream Place 1 g vaginally twice a week. 42.5 g 4    rosuvastatin (CRESTOR) 10 MG tablet Take 1 tablet (10 mg total) by mouth once daily. 90 tablet 3    triamcinolone acetonide 0.1% (KENALOG) 0.1 % ointment Apply topically 2 (two) times daily. 30 g 0    meclizine (ANTIVERT) 25 mg tablet Take 1 tablet (25 mg total) by mouth 3 (three) times daily as needed for Dizziness. 52 tablet 0    [DISCONTINUED] DULoxetine (CYMBALTA) 30 MG capsule Take 1 capsule (30 mg total) by mouth once daily. (Patient not taking: Reported on 7/1/2024) 30 capsule 11     No current facility-administered medications on file prior to visit.

## 2025-03-11 DIAGNOSIS — J32.0 MAXILLARY SINUSITIS, UNSPECIFIED CHRONICITY: ICD-10-CM

## 2025-03-11 RX ORDER — FLUTICASONE PROPIONATE 50 MCG
1 SPRAY, SUSPENSION (ML) NASAL DAILY
Qty: 16 G | Refills: 0 | Status: SHIPPED | OUTPATIENT
Start: 2025-03-11

## 2025-03-24 ENCOUNTER — OFFICE VISIT (OUTPATIENT)
Dept: FAMILY MEDICINE | Facility: CLINIC | Age: 65
End: 2025-03-24
Payer: COMMERCIAL

## 2025-03-24 VITALS
TEMPERATURE: 98 F | RESPIRATION RATE: 19 BRPM | HEART RATE: 93 BPM | DIASTOLIC BLOOD PRESSURE: 80 MMHG | WEIGHT: 149.5 LBS | BODY MASS INDEX: 28.22 KG/M2 | OXYGEN SATURATION: 97 % | HEIGHT: 61 IN | SYSTOLIC BLOOD PRESSURE: 122 MMHG

## 2025-03-24 DIAGNOSIS — J04.0 REFLUX LARYNGITIS: ICD-10-CM

## 2025-03-24 DIAGNOSIS — Z00.00 ANNUAL PHYSICAL EXAM: Primary | ICD-10-CM

## 2025-03-24 DIAGNOSIS — R09.82 POST-NASAL DRIP: Primary | ICD-10-CM

## 2025-03-24 DIAGNOSIS — K21.9 REFLUX LARYNGITIS: ICD-10-CM

## 2025-03-24 PROCEDURE — 1159F MED LIST DOCD IN RCRD: CPT | Mod: CPTII,S$GLB,, | Performed by: STUDENT IN AN ORGANIZED HEALTH CARE EDUCATION/TRAINING PROGRAM

## 2025-03-24 PROCEDURE — 3079F DIAST BP 80-89 MM HG: CPT | Mod: CPTII,S$GLB,, | Performed by: STUDENT IN AN ORGANIZED HEALTH CARE EDUCATION/TRAINING PROGRAM

## 2025-03-24 PROCEDURE — 3074F SYST BP LT 130 MM HG: CPT | Mod: CPTII,S$GLB,, | Performed by: STUDENT IN AN ORGANIZED HEALTH CARE EDUCATION/TRAINING PROGRAM

## 2025-03-24 PROCEDURE — 1160F RVW MEDS BY RX/DR IN RCRD: CPT | Mod: CPTII,S$GLB,, | Performed by: STUDENT IN AN ORGANIZED HEALTH CARE EDUCATION/TRAINING PROGRAM

## 2025-03-24 PROCEDURE — 99999 PR PBB SHADOW E&M-EST. PATIENT-LVL IV: CPT | Mod: PBBFAC,,, | Performed by: STUDENT IN AN ORGANIZED HEALTH CARE EDUCATION/TRAINING PROGRAM

## 2025-03-24 PROCEDURE — 99214 OFFICE O/P EST MOD 30 MIN: CPT | Mod: S$GLB,,, | Performed by: STUDENT IN AN ORGANIZED HEALTH CARE EDUCATION/TRAINING PROGRAM

## 2025-03-24 PROCEDURE — 3008F BODY MASS INDEX DOCD: CPT | Mod: CPTII,S$GLB,, | Performed by: STUDENT IN AN ORGANIZED HEALTH CARE EDUCATION/TRAINING PROGRAM

## 2025-03-24 RX ORDER — AZELASTINE HYDROCHLORIDE, FLUTICASONE PROPIONATE 137; 50 UG/1; UG/1
1 SPRAY, METERED NASAL 2 TIMES DAILY
Qty: 23 G | Refills: 1 | Status: SHIPPED | OUTPATIENT
Start: 2025-03-24 | End: 2025-04-23

## 2025-03-24 RX ORDER — PANTOPRAZOLE SODIUM 40 MG/1
40 TABLET, DELAYED RELEASE ORAL DAILY
Qty: 90 TABLET | Refills: 1 | Status: SHIPPED | OUTPATIENT
Start: 2025-03-24 | End: 2025-09-20

## 2025-03-24 NOTE — PROGRESS NOTES
[facility] Note    Chief Complaint      Chief Complaint   Patient presents with    Otalgia     Bilateral x3 weeks; pain going into neck.     History of Present Illness      Jared Chris is a 64yo F with HLD, PMS, MDD who presents with concerns about ear pain and cough, following a recent ear infection treated three weeks ago.  Dot reports ongoing ear discomfort following an ear infection treated 3 weeks ago. She was evaluated by a nurse practitioner, Michelle, who diagnosed her right ear as erythematous and inflamed, while the left ear was clear at that time. She was prescribed antibiotics doxycycline and a nasal spray. She initially improved after treatment but had a recurrence of symptoms.    Currently, she reports pain in both ears, with the right ear being more severe. She also mentions a cough that started in the past 2 nights, similar to her previous episode. The cough is primarily nocturnal, occurring when she lies down. Her neck started hurting this morning as well. She has tinnitus in one ear.    She recalls having similar ear issues twice last year around the same time. Her sister has been having similar symptoms. She expresses concern about whether these recurring symptoms might be related to a vaccination, though she dismisses this idea.    She denies any reflux issues or burning sensations in her chest.    MEDICATIONS:  - Fluticasone nasal spray, previously prescribed for nasal symptoms  -doxycycline, previously prescribed for ear infection  - Nasal spray (unspecified), previously prescribed  - Discontinued steroid (unspecified) prescribed by nurse practitioner 3 weeks ago for ear infection    MEDICAL HISTORY:  - Ear infection: 3 weeks ago    FAMILY HISTORY:  - Sister: Similar ear issues as the patient      ROS:  General: -fever, -chills, -fatigue, -weight gain, -weight loss  Eyes: -vision changes, -redness, -discharge  ENT: +ear pain, -nasal congestion, -sore throat,  "+tinnitus  Cardiovascular: -chest pain, -palpitations, -lower extremity edema  Respiratory: +cough, -shortness of breath, +waking at night coughing  Gastrointestinal: -abdominal pain, -nausea, -vomiting, -diarrhea, -constipation, -blood in stool  Genitourinary: -dysuria, -hematuria, -frequency  Musculoskeletal: -joint pain, -muscle pain  Skin: -rash, -lesion  Neurological: -headache, -dizziness, -numbness, -tingling  Psychiatric: -anxiety, -depression, -sleep difficulty  Neck: +neck pain          Dot Coleman is a 64 y.o. female who presents with:    Problem List Addressed This Visit:  1. Post-nasal drip  -     azelastine-fluticasone (DYMISTA) 137-50 mcg/spray Spry nassal spray; 1 spray by Each Nostril route 2 (two) times daily. for 14 days  Dispense: 23 g; Refill: 1    2. Reflux laryngitis  -     pantoprazole (PROTONIX) 40 MG tablet; Take 1 tablet (40 mg total) by mouth once daily.  Dispense: 90 tablet; Refill: 1           Encounter Medications[1]     Review of patient's allergies indicates:   Allergen Reactions    Orange Swelling    Penicillins Other (See Comments)     Shakes       Peanut Hives     unknown       Physical Exam      Vital Signs  Temp: 98.2 °F (36.8 °C)  Temp Source: Temporal  Pulse: 93  Resp: 19  SpO2: 97 %  BP: 122/80  BP Location: Right arm  Patient Position: Sitting  Pain Score:   3  Pain Loc: Ear  Height and Weight  Height: 5' 1" (154.9 cm)  Weight: 67.8 kg (149 lb 7.6 oz)  BSA (Calculated - sq m): 1.71 sq meters  BMI (Calculated): 28.3  Weight in (lb) to have BMI = 25: 132]    Physical Exam    General: No acute distress. Well-developed. Well-nourished.  Eyes: EOMI. Sclerae anicteric.  HENT: Normocephalic. Atraumatic. Nares patent. Moist oral mucosa.  Ears: Slight fluid behind left ear. No infection in ears. Bilateral EACs clear. No wax buildup in ears.  Cardiovascular: Regular rate. Regular rhythm. No murmurs. No rubs. No gallops. Normal S1, S2.  Respiratory: Normal respiratory effort. Clear " "to auscultation bilaterally. No rales. No rhonchi. No wheezing.  Abdomen: Soft. Non-tender. Non-distended. Normoactive bowel sounds.  Musculoskeletal: No  obvious deformity.  Extremities: No lower extremity edema.  Neurological: Alert & oriented x3. No slurred speech. Normal gait.  Psychiatric: Normal mood. Normal affect. Good insight. Good judgment.  Skin: Warm. Dry. No rash.          Laboratory:  CBC:  No results for input(s): "WBC", "RBC", "HGB", "HCT", "PLT", "MCV", "MCH", "MCHC" in the last 2160 hours.  CMP:  No results for input(s): "GLU", "CALCIUM", "ALBUMIN", "PROT", "NA", "K", "CO2", "CL", "BUN", "ALKPHOS", "ALT", "AST", "BILITOT" in the last 2160 hours.    Invalid input(s): "CREATININ"  URINALYSIS:  No results for input(s): "COLORU", "CLARITYU", "SPECGRAV", "PHUR", "PROTEINUA", "GLUCOSEU", "BILIRUBINCON", "BLOODU", "WBCU", "RBCU", "BACTERIA", "MUCUS", "NITRITE", "LEUKOCYTESUR", "UROBILINOGEN", "HYALINECASTS" in the last 2160 hours.   LIPIDS:  No results for input(s): "TSH", "HDL", "CHOL", "TRIG", "LDLCALC", "CHOLHDL", "NONHDLCHOL", "TOTALCHOLEST" in the last 2160 hours.  TSH:  No results for input(s): "TSH" in the last 2160 hours.  A1C:  No results for input(s): "HGBA1C" in the last 2160 hours.    Radiology:      Assessment/Plan     Dot Coleman is a 64 y.o.female with:    1. Post-nasal drip  - azelastine-fluticasone (DYMISTA) 137-50 mcg/spray Spry nassal spray; 1 spray by Each Nostril route 2 (two) times daily. for 14 days  Dispense: 23 g; Refill: 1    2. Reflux laryngitis  - pantoprazole (PROTONIX) 40 MG tablet; Take 1 tablet (40 mg total) by mouth once daily.  Dispense: 90 tablet; Refill: 1    Assessment & Plan    - Noted slight fluid behind the right ear, likely causing lingering symptoms of tinnitus.  - Explained that lingering ear symptoms, including tinnitus, are common after an ear infection and will resolve over time.  - Dot reports ringing in the right ear.  - Attributed the tinnitus to the " lingering effects of the recent ear infection and the presence of fluid in the ear.  - Reassured the patient that symptoms will improve over time.:  - Considered the possibility of reflux contributing to nighttime cough.  - Discussed that reflux can sometimes present as nighttime cough without typical burning sensation.  - Prescribed Protonix (PPI): 1 dose before breakfast or before dinner to address potential reflux-related cough.    -Continue current medications and maintain follow up with specialists.      Patient verbalizes understanding and agrees with current treatment plan.    This note was generated with the assistance of ambient listening technology. I attest to having reviewed and edited the generated note for accuracy, though some syntax or spelling errors may persist. Please contact the author of this note for any clarification.     33 minutes of total time spent on the encounter, which includes face to face time and non-face to face time preparing to see the patient (eg, review of tests), Obtaining and/or reviewing separately obtained history, Documenting clinical information in the electronic or other health record, Independently interpreting results (not separately reported) and communicating results to the patient or Care coordination (not separately reported).      Sosa Carlos MD  Internal Medicine   [facility]                       [1]   Outpatient Encounter Medications as of 3/24/2025   Medication Sig Dispense Refill    estradioL (ESTRACE) 0.01 % (0.1 mg/gram) vaginal cream Place 1 g vaginally twice a week. 42.5 g 4    rosuvastatin (CRESTOR) 10 MG tablet Take 1 tablet (10 mg total) by mouth once daily. 90 tablet 3    triamcinolone acetonide 0.1% (KENALOG) 0.1 % ointment Apply topically 2 (two) times daily. 30 g 0    [DISCONTINUED] fluticasone propionate (FLONASE) 50 mcg/actuation nasal spray 1 spray (50 mcg total) by Each Nostril route once daily. 16 g 0     azelastine-fluticasone (DYMISTA) 137-50 mcg/spray Spry nassal spray 1 spray by Each Nostril route 2 (two) times daily. for 14 days 23 g 1    meclizine (ANTIVERT) 25 mg tablet Take 1 tablet (25 mg total) by mouth 3 (three) times daily as needed for Dizziness. 52 tablet 0    pantoprazole (PROTONIX) 40 MG tablet Take 1 tablet (40 mg total) by mouth once daily. 90 tablet 1    [DISCONTINUED] doxycycline (VIBRAMYCIN) 100 MG Cap Take 1 capsule (100 mg total) by mouth 2 (two) times daily. (Patient not taking: Reported on 3/24/2025) 20 capsule 0     No facility-administered encounter medications on file as of 3/24/2025.

## 2025-04-07 ENCOUNTER — RESULTS FOLLOW-UP (OUTPATIENT)
Dept: FAMILY MEDICINE | Facility: CLINIC | Age: 65
End: 2025-04-07

## 2025-04-07 ENCOUNTER — OFFICE VISIT (OUTPATIENT)
Dept: FAMILY MEDICINE | Facility: CLINIC | Age: 65
End: 2025-04-07
Payer: COMMERCIAL

## 2025-04-07 VITALS
DIASTOLIC BLOOD PRESSURE: 82 MMHG | SYSTOLIC BLOOD PRESSURE: 110 MMHG | TEMPERATURE: 98 F | BODY MASS INDEX: 28.53 KG/M2 | WEIGHT: 151.13 LBS | OXYGEN SATURATION: 98 % | HEIGHT: 61 IN | RESPIRATION RATE: 20 BRPM | HEART RATE: 109 BPM

## 2025-04-07 DIAGNOSIS — J20.8 ACUTE BRONCHITIS DUE TO OTHER SPECIFIED ORGANISMS: Primary | ICD-10-CM

## 2025-04-07 DIAGNOSIS — R09.82 POST-NASAL DRIP: ICD-10-CM

## 2025-04-07 PROCEDURE — 3008F BODY MASS INDEX DOCD: CPT | Mod: CPTII,S$GLB,, | Performed by: STUDENT IN AN ORGANIZED HEALTH CARE EDUCATION/TRAINING PROGRAM

## 2025-04-07 PROCEDURE — 1159F MED LIST DOCD IN RCRD: CPT | Mod: CPTII,S$GLB,, | Performed by: STUDENT IN AN ORGANIZED HEALTH CARE EDUCATION/TRAINING PROGRAM

## 2025-04-07 PROCEDURE — 3074F SYST BP LT 130 MM HG: CPT | Mod: CPTII,S$GLB,, | Performed by: STUDENT IN AN ORGANIZED HEALTH CARE EDUCATION/TRAINING PROGRAM

## 2025-04-07 PROCEDURE — 99999 PR PBB SHADOW E&M-EST. PATIENT-LVL IV: CPT | Mod: PBBFAC,,, | Performed by: STUDENT IN AN ORGANIZED HEALTH CARE EDUCATION/TRAINING PROGRAM

## 2025-04-07 PROCEDURE — 3079F DIAST BP 80-89 MM HG: CPT | Mod: CPTII,S$GLB,, | Performed by: STUDENT IN AN ORGANIZED HEALTH CARE EDUCATION/TRAINING PROGRAM

## 2025-04-07 PROCEDURE — 99214 OFFICE O/P EST MOD 30 MIN: CPT | Mod: S$GLB,,, | Performed by: STUDENT IN AN ORGANIZED HEALTH CARE EDUCATION/TRAINING PROGRAM

## 2025-04-07 PROCEDURE — 1160F RVW MEDS BY RX/DR IN RCRD: CPT | Mod: CPTII,S$GLB,, | Performed by: STUDENT IN AN ORGANIZED HEALTH CARE EDUCATION/TRAINING PROGRAM

## 2025-04-07 RX ORDER — PREDNISONE 20 MG/1
TABLET ORAL
Qty: 17 TABLET | Refills: 0 | Status: SHIPPED | OUTPATIENT
Start: 2025-04-07 | End: 2025-04-21

## 2025-04-07 NOTE — PROGRESS NOTES
"[facility] Note    Chief Complaint      Chief Complaint   Patient presents with    Cough     Denies sore throat and fever    Otalgia     Left ear to neck pain    Headache     All symptoms x 1 week     History of Present Illness      Jared Chris is a 62yo F with HLD, PMS, MDD who presents with concerns about ear pain and cough, following a recent ear infection treated 5 weeks ago, evaluated for same complaints about 2 weeks ago . She could not sleep overnight due to diffuse wheezing and non productive cough. She reports no CP, mild SOB , no orthopnea or PND. She has h/o childhood asthma and improved in the 20s \    Dot Coleman is a 64 y.o. female who presents with:    Problem List Addressed This Visit:  1. Acute bronchitis due to other specified organisms  -     X-Ray Chest PA And Lateral; Future; Expected date: 04/07/2025  -     predniSONE (DELTASONE) 20 MG tablet; Take 2 tablets (40 mg total) by mouth once daily for 5 days, THEN 1 tablet (20 mg total) once daily for 5 days, THEN 0.5 tablets (10 mg total) once daily for 4 days.  Dispense: 17 tablet; Refill: 0  -     albuterol-budesonide (AIRSUPRA) 90-80 mcg/actuation; Inhale 2 puffs into the lungs 2 (two) times a day.  Dispense: 10.7 g; Refill: 1    2. Post-nasal drip           Encounter Medications[1]     Review of patient's allergies indicates:   Allergen Reactions    Orange Swelling    Penicillins Other (See Comments)     Shakes       Peanut Hives     unknown       Physical Exam      Vital Signs  Temp: 98.1 °F (36.7 °C)  Temp Source: Temporal  Pulse: 109  Resp: 20  SpO2: 98 %  BP: 110/82  BP Location: Left arm  Patient Position: Sitting  Pain Score:   4  Pain Loc: Ear (left ear)  Height and Weight  Height: 5' 1" (154.9 cm)  Weight: 68.6 kg (151 lb 2 oz)  BSA (Calculated - sq m): 1.72 sq meters  BMI (Calculated): 28.6  Weight in (lb) to have BMI = 25: 132]    Physical Exam               Laboratory:  CBC:  No results for input(s): " ""WBC", "RBC", "HGB", "HCT", "PLT", "MCV", "MCH", "MCHC" in the last 2160 hours.  CMP:  No results for input(s): "GLU", "CALCIUM", "ALBUMIN", "PROT", "NA", "K", "CO2", "CL", "BUN", "ALKPHOS", "ALT", "AST", "BILITOT" in the last 2160 hours.    Invalid input(s): "CREATININ"  URINALYSIS:  No results for input(s): "COLORU", "CLARITYU", "SPECGRAV", "PHUR", "PROTEINUA", "GLUCOSEU", "BILIRUBINCON", "BLOODU", "WBCU", "RBCU", "BACTERIA", "MUCUS", "NITRITE", "LEUKOCYTESUR", "UROBILINOGEN", "HYALINECASTS" in the last 2160 hours.   LIPIDS:  No results for input(s): "TSH", "HDL", "CHOL", "TRIG", "LDLCALC", "CHOLHDL", "NONHDLCHOL", "TOTALCHOLEST" in the last 2160 hours.  TSH:  No results for input(s): "TSH" in the last 2160 hours.  A1C:  No results for input(s): "HGBA1C" in the last 2160 hours.    Radiology:      Assessment/Plan     Dot Coleman is a 64 y.o.female with:    1. Acute bronchitis due to other specified organisms  - X-Ray Chest PA And Lateral; Future  - predniSONE (DELTASONE) 20 MG tablet; Take 2 tablets (40 mg total) by mouth once daily for 5 days, THEN 1 tablet (20 mg total) once daily for 5 days, THEN 0.5 tablets (10 mg total) once daily for 4 days.  Dispense: 17 tablet; Refill: 0  - albuterol-budesonide (AIRSUPRA) 90-80 mcg/actuation; Inhale 2 puffs into the lungs 2 (two) times a day.  Dispense: 10.7 g; Refill: 1    2. Post-nasal drip    Assessment & Plan    -XR chest PA and lat ordered due to worsening of symptoms   -oral prednisone tapered dose  -airsupra 2puff BID  -will schedule for PFT once acute symptoms resolves   -c/w dymista and zyrtec 10mg daily     31 minutes of total time spent on the encounter, which includes face to face time and non-face to face time preparing to see the patient (eg, review of tests), Obtaining and/or reviewing separately obtained history, Documenting clinical information in the electronic or other health record, Independently interpreting results (not separately reported) and " communicating results to the patient or Care coordination (not separately reported).      Sosa Carlos MD  Internal Medicine   [facility]                         [1]   Outpatient Encounter Medications as of 4/7/2025   Medication Sig Dispense Refill    azelastine-fluticasone (DYMISTA) 137-50 mcg/spray Spry nassal spray 1 spray by Each Nostril route 2 (two) times daily. for 14 days 23 g 1    estradioL (ESTRACE) 0.01 % (0.1 mg/gram) vaginal cream Place 1 g vaginally twice a week. 42.5 g 4    pantoprazole (PROTONIX) 40 MG tablet Take 1 tablet (40 mg total) by mouth once daily. 90 tablet 1    rosuvastatin (CRESTOR) 10 MG tablet Take 1 tablet (10 mg total) by mouth once daily. 90 tablet 3    triamcinolone acetonide 0.1% (KENALOG) 0.1 % ointment Apply topically 2 (two) times daily. 30 g 0    albuterol-budesonide (AIRSUPRA) 90-80 mcg/actuation Inhale 2 puffs into the lungs 2 (two) times a day. 10.7 g 1    meclizine (ANTIVERT) 25 mg tablet Take 1 tablet (25 mg total) by mouth 3 (three) times daily as needed for Dizziness. 52 tablet 0    predniSONE (DELTASONE) 20 MG tablet Take 2 tablets (40 mg total) by mouth once daily for 5 days, THEN 1 tablet (20 mg total) once daily for 5 days, THEN 0.5 tablets (10 mg total) once daily for 4 days. 17 tablet 0     No facility-administered encounter medications on file as of 4/7/2025.

## 2025-05-05 ENCOUNTER — HOSPITAL ENCOUNTER (OUTPATIENT)
Dept: PULMONOLOGY | Facility: HOSPITAL | Age: 65
Discharge: HOME | End: 2025-05-05
Attending: STUDENT IN AN ORGANIZED HEALTH CARE EDUCATION/TRAINING PROGRAM
Payer: COMMERCIAL

## 2025-05-05 DIAGNOSIS — J20.8 ACUTE BRONCHITIS DUE TO OTHER SPECIFIED ORGANISMS: ICD-10-CM

## 2025-05-05 LAB
DLCO SINGLE BREATH LLN: 14.86
DLCO SINGLE BREATH PRE REF: 76.9 %
DLCO SINGLE BREATH REF: 20.59
DLCOC SBVA LLN: 2.99
DLCOC SBVA REF: 4.64
DLCOC SINGLE BREATH LLN: 14.86
DLCOC SINGLE BREATH REF: 20.59
DLCOVA LLN: 2.99
DLCOVA PRE REF: 94.7 %
DLCOVA PRE: 4.4 ML/(MIN*MMHG*L) (ref 2.99–6.3)
DLCOVA REF: 4.64
FEF 25 75 CHG: 26.2 %
FEF 25 75 LLN: 1.35
FEF 25 75 POST REF: 103.8 %
FEF 25 75 PRE REF: 82.2 %
FEF 25 75 REF: 2.75
FET100 CHG: -3 %
FEV1 CHG: 3.3 %
FEV1 FVC CHG: 3.5 %
FEV1 FVC LLN: 68
FEV1 FVC POST REF: 106.5 %
FEV1 FVC PRE REF: 102.9 %
FEV1 FVC REF: 80
FEV1 LLN: 1.36
FEV1 POST REF: 100.8 %
FEV1 PRE REF: 97.6 %
FEV1 REF: 1.89
FVC CHG: -0.2 %
FVC LLN: 1.73
FVC POST REF: 94.3 %
FVC PRE REF: 94.5 %
FVC REF: 2.37
IVC PRE: 2.24 L (ref 2.08–3.49)
IVC SINGLE BREATH LLN: 1.73
IVC SINGLE BREATH PRE REF: 94.3 %
IVC SINGLE BREATH REF: 2.37
PEF CHG: 19.1 %
PEF LLN: 3.17
PEF POST REF: 111.8 %
PEF PRE REF: 93.8 %
PEF REF: 4.99
POST FEF 25 75: 2.85 L/S (ref 1.35–4.15)
POST FET 100: 6.57 SEC
POST FEV1 FVC: 85.07 % (ref 67.07–90.03)
POST FEV1: 1.9 L (ref 1.64–2.72)
POST FVC: 2.24 L (ref 2.08–3.49)
POST PEF: 5.58 L/S (ref 4.18–7.28)
PRE DLCO: 15.83 ML/(MIN*MMHG) (ref 14.86–26.32)
PRE FEF 25 75: 2.26 L/S (ref 1.35–4.15)
PRE FET 100: 6.77 SEC
PRE FEV1 FVC: 82.18 % (ref 67.07–90.03)
PRE FEV1: 1.84 L (ref 1.64–2.72)
PRE FVC: 2.24 L (ref 2.08–3.49)
PRE PEF: 4.68 L/S (ref 4.18–7.28)
VA PRE: 3.6 L (ref 4.29–4.29)
VA SINGLE BREATH LLN: 4.29
VA SINGLE BREATH PRE REF: 84 %
VA SINGLE BREATH REF: 4.29

## 2025-05-05 PROCEDURE — 94010 BREATHING CAPACITY TEST: CPT

## 2025-05-05 PROCEDURE — 94640 AIRWAY INHALATION TREATMENT: CPT

## 2025-05-05 PROCEDURE — 94729 DIFFUSING CAPACITY: CPT

## 2025-05-28 ENCOUNTER — PATIENT OUTREACH (OUTPATIENT)
Dept: ADMINISTRATIVE | Facility: HOSPITAL | Age: 65
End: 2025-05-28
Payer: COMMERCIAL

## 2025-05-28 NOTE — PROGRESS NOTES
Health Maintenance Topic(s) Outreach Outcomes & Actions Taken:    Colorectal Cancer Screening - Outreach Outcomes & Actions Taken  : External Records Requested & Care Team Updated if Applicable

## 2025-05-28 NOTE — LETTER
AUTHORIZATION FOR RELEASE OF   CONFIDENTIAL INFORMATION    Cabrinbrigid,    We are seeing Dot Coleman, date of birth 1960, in the clinic at [facility]. Sosa Carlos MD is the patient's PCP. Dot Coleman has an outstanding lab/procedure at the time we reviewed her chart. In order to help keep her health information updated, she has authorized us to request the following medical record(s):                                   ( xx )  COLONOSCOPY           Please fax records to [facility], Adeyanju, Oluwakemi E., MD, [phone]             Patient Name: Dot Coleman  : 1960  Patient Phone #: [phone]

## 2025-06-04 ENCOUNTER — PATIENT MESSAGE (OUTPATIENT)
Dept: FAMILY MEDICINE | Facility: CLINIC | Age: 65
End: 2025-06-04
Payer: COMMERCIAL

## 2025-06-12 ENCOUNTER — OFFICE VISIT (OUTPATIENT)
Dept: FAMILY MEDICINE | Facility: CLINIC | Age: 65
End: 2025-06-12
Payer: COMMERCIAL

## 2025-06-12 ENCOUNTER — TELEPHONE (OUTPATIENT)
Dept: ENDOSCOPY | Facility: HOSPITAL | Age: 65
End: 2025-06-12
Payer: COMMERCIAL

## 2025-06-12 ENCOUNTER — CLINICAL SUPPORT (OUTPATIENT)
Dept: ENDOSCOPY | Facility: HOSPITAL | Age: 65
End: 2025-06-12
Attending: STUDENT IN AN ORGANIZED HEALTH CARE EDUCATION/TRAINING PROGRAM
Payer: COMMERCIAL

## 2025-06-12 VITALS
BODY MASS INDEX: 29.27 KG/M2 | OXYGEN SATURATION: 98 % | WEIGHT: 155 LBS | DIASTOLIC BLOOD PRESSURE: 70 MMHG | HEART RATE: 91 BPM | SYSTOLIC BLOOD PRESSURE: 112 MMHG | HEIGHT: 61 IN | RESPIRATION RATE: 20 BRPM | TEMPERATURE: 98 F

## 2025-06-12 VITALS — HEIGHT: 61 IN | BODY MASS INDEX: 29.07 KG/M2 | WEIGHT: 154 LBS

## 2025-06-12 DIAGNOSIS — E66.3 OVERWEIGHT WITH BODY MASS INDEX (BMI) OF 29 TO 29.9 IN ADULT: ICD-10-CM

## 2025-06-12 DIAGNOSIS — E78.49 OTHER HYPERLIPIDEMIA: ICD-10-CM

## 2025-06-12 DIAGNOSIS — Z00.00 ANNUAL PHYSICAL EXAM: ICD-10-CM

## 2025-06-12 DIAGNOSIS — Z12.12 ENCOUNTER FOR COLORECTAL CANCER SCREENING: ICD-10-CM

## 2025-06-12 DIAGNOSIS — J30.9 CHRONIC ALLERGIC RHINITIS: ICD-10-CM

## 2025-06-12 DIAGNOSIS — Z23 NEED FOR PNEUMOCOCCAL VACCINATION: ICD-10-CM

## 2025-06-12 DIAGNOSIS — Z12.11 ENCOUNTER FOR COLORECTAL CANCER SCREENING: ICD-10-CM

## 2025-06-12 DIAGNOSIS — Z12.11 ENCOUNTER FOR COLORECTAL CANCER SCREENING: Primary | ICD-10-CM

## 2025-06-12 DIAGNOSIS — R74.01 TRANSAMINITIS: ICD-10-CM

## 2025-06-12 DIAGNOSIS — Z12.12 ENCOUNTER FOR COLORECTAL CANCER SCREENING: Primary | ICD-10-CM

## 2025-06-12 PROCEDURE — 99999 PR PBB SHADOW E&M-EST. PATIENT-LVL IV: CPT | Mod: PBBFAC,,, | Performed by: STUDENT IN AN ORGANIZED HEALTH CARE EDUCATION/TRAINING PROGRAM

## 2025-06-12 RX ORDER — MONTELUKAST SODIUM 10 MG/1
10 TABLET ORAL NIGHTLY
Qty: 30 TABLET | Refills: 0 | Status: SHIPPED | OUTPATIENT
Start: 2025-06-12 | End: 2025-07-12

## 2025-06-12 NOTE — PROGRESS NOTES
[facility] Note    Chief Complaint      Chief Complaint   Patient presents with    Annual Exam     History of Present Illness      Jared Chris is a 63yo F with HLD, PMS who presents for annual physicals and to discuss ongoing cough and congestion symptoms, as well as to review recent lab results. Dot reports ongoing cough and congestion. The cough is persistent and bothersome, with symptoms fluctuating in severity from day to day. She recalls a few days last week when the symptoms were particularly severe before subsiding and then recurring.    She has been using Flonase and Zyrtec daily for symptom management. Her congestion is less severe than during her previous visit in April.    She denies coughing up yellowish, grainy, or bloody secretions.    TEST RESULTS:  - Liver enzyme: Recent, slightly elevated  - Blood level: Recent, normal  - Kidney function: Recent, normal  - Liver function: Recent, normal (aside from slight enzyme elevation)  - Electrolytes: Recent, normal  - Cholesterol: Recent, normal  - A1C: Recent, normal  - Thyroid: Recent, normal      ROS:  General: -fever, -chills, -fatigue, -weight gain, -weight loss  Eyes: -vision changes, -redness, -discharge  ENT: -ear pain, +nasal congestion, -sore throat, +nasal discharge, +productive cough, +nosebleeds  Cardiovascular: -chest pain, -palpitations, -lower extremity edema  Respiratory: +cough, -shortness of breath  Gastrointestinal: -abdominal pain, -nausea, -vomiting, -diarrhea, -constipation, -blood in stool  Genitourinary: -dysuria, -hematuria, -frequency  Musculoskeletal: -joint pain, -muscle pain  Skin: -rash, -lesion  Neurological: -headache, -dizziness, -numbness, -tingling  Psychiatric: -anxiety, -depression, -sleep difficulty          Dot Coleman is a 64 y.o. female who presents with:    Problem List Addressed This Visit:  1. Encounter for colorectal cancer screening  -     Ambulatory referral/consult to Krista  "Procedure ; Future; Expected date: 06/13/2025    2. Annual physical exam    3. Need for pneumococcal vaccination  -     pneumoc 20-emi conj-dip cr(PF) (PREVNAR-20 (PF)) injection Syrg 0.5 mL    4. Chronic allergic rhinitis  -     montelukast (SINGULAIR) 10 mg tablet; Take 1 tablet (10 mg total) by mouth every evening.  Dispense: 30 tablet; Refill: 0    5. Other hyperlipidemia    6. Transaminitis    7. Overweight with body mass index (BMI) of 29 to 29.9 in adult           Encounter Medications[1]     Review of patient's allergies indicates:   Allergen Reactions    Orange Swelling    Penicillins Other (See Comments)     Shakes       Peanut Hives     unknown       Physical Exam      Vital Signs  Temp: 97.9 °F (36.6 °C)  Temp Source: Temporal  Pulse: 91  Resp: 20  SpO2: 98 %  BP: 112/70  BP Location: Right arm  Patient Position: Sitting  Pain Score: 0-No pain  Height and Weight  Height: 5' 1" (154.9 cm)  Weight: 70.3 kg (154 lb 15.7 oz)  BSA (Calculated - sq m): 1.74 sq meters  BMI (Calculated): 29.3  Weight in (lb) to have BMI = 25: 132]    Physical Exam    General: No acute distress. Well-developed. Well-nourished.  Eyes: EOMI. Sclerae anicteric.  HENT: Normocephalic. Atraumatic. Nares patent. Moist oral mucosa. Tiny, transparent fluid in nostrils.  Ears: Bilateral TMs clear. Bilateral EACs clear.  Cardiovascular: Regular rate. Regular rhythm. No murmurs. No rubs. No gallops. Normal S1, S2.  Respiratory: Normal respiratory effort. Clear to auscultation bilaterally. No rales. No rhonchi. No wheezing. Lungs are very clear.  Abdomen: Soft. Non-tender. Non-distended. Normoactive bowel sounds.  Musculoskeletal: No  obvious deformity.  Extremities: No lower extremity edema.  Neurological: Alert & oriented x3. No slurred speech. Normal gait.  Psychiatric: Normal mood. Normal affect. Good insight. Good judgment.  Skin: Warm. Dry. No rash.          Laboratory:  CBC:  Recent Labs   Lab Result Units 06/10/25  0720   WBC " "K/uL 6.19   RBC M/uL 4.27   HGB gm/dL 13.4   HCT % 41.6   Platelet Count K/uL 172   MCV fL 97   MCH pg 31.4*   MCHC g/dL 32.2     CMP:  Recent Labs   Lab Result Units 06/10/25  0720   Glucose mg/dL 87   Calcium mg/dL 9.4   Albumin g/dL 3.7   Protein Total gm/dL 7.1   Sodium mmol/L 140   Potassium mmol/L 4.0   CO2 mmol/L 27   Chloride mmol/L 108   BUN mg/dL 18   ALP unit/L 99   ALT unit/L 54*   AST unit/L 36   Bilirubin Total mg/dL 0.6     URINALYSIS:  No results for input(s): "COLORU", "CLARITYU", "SPECGRAV", "PHUR", "PROTEINUA", "GLUCOSEU", "BILIRUBINCON", "BLOODU", "WBCU", "RBCU", "BACTERIA", "MUCUS", "NITRITE", "LEUKOCYTESUR", "UROBILINOGEN", "HYALINECASTS" in the last 2160 hours.   LIPIDS:  Recent Labs   Lab Result Units 06/10/25  0720   TSH uIU/mL 2.202   HDL Cholesterol mg/dL 60   Cholesterol Total mg/dL 156   Triglyceride mg/dL 70   LDL Cholesterol mg/dL 82.0   HDL/Cholesterol Ratio % 38.5   Non HDL Cholesterol mg/dL 96   Cholesterol/HDL Ratio  2.6     TSH:  Recent Labs   Lab Result Units 06/10/25  0720   TSH uIU/mL 2.202     A1C:  Recent Labs   Lab Result Units 06/10/25  0720   Hemoglobin A1c % 5.3       Radiology:      Assessment/Plan     Dot Coleman is a 64 y.o.female with:    1. Annual physical exam    2. Encounter for colorectal cancer screening  - Ambulatory referral/consult to Endo Procedure ; Future    3. Need for pneumococcal vaccination  - pneumoc 20-emi conj-dip cr(PF) (PREVNAR-20 (PF)) injection Syrg 0.5 mL    4. Chronic allergic rhinitis  - montelukast (SINGULAIR) 10 mg tablet; Take 1 tablet (10 mg total) by mouth every evening.  Dispense: 30 tablet; Refill: 0    5. Other hyperlipidemia    6. Transaminitis    7. Overweight with body mass index (BMI) of 29 to 29.9 in adult    Assessment & Plan      ## HYPERLIPIDEMIA:  ##TRANSAMINITIS   - Monitored the patient who is on Crestor.  - Evaluated liver enzyme levels which are slightly elevated due to Crestor use, but not clinically " significant or concerning.  - Reviewed recent labs, finding all other parameters within normal limits.    ## CHRONIC ALLERGIC RHINITIS/BRONCHITIS :  -s/p complete PFT : WNL   - Monitored the patient who experiences congestion and cough, worsened by allergies.  - Evaluated the patient's nostrils which show transparent fluid indicating mild congestion.  - Assessed persistent cough, likely due to allergies given extensive previous workup.  - Examined ears and nasal passages.  - Suggested trying Singulair in addition to Claritin for allergy management.  - Prescribed Singulair 1 tablet nightly for 30 days to help with congestion and rhinitis.  - Advised the patient to continue using Flonase and Zyrtec daily to stabilize cells and prevent further deterioration of allergy symptoms.    ## FAMILY HISTORY OF COLON CANCER:  - Assessed that father had colon cancer  - Ordered colonoscopy due to family history of colon cancer.    ## BMI 29  -c/w life style modifications     ##ANNUAL PHYSICALS   -preventive counseling provided  -labs due ordered  -vaccine s due : PCV 20, COVID  -due for colorectal cancer screening , opts for colonoscopy   -UTD on cervical and breats cancer screening            -Continue current medications and maintain follow up with specialists.      Patient verbalizes understanding and agrees with current treatment plan.    This note was generated with the assistance of ambient listening technology. I attest to having reviewed and edited the generated note for accuracy, though some syntax or spelling errors may persist. Please contact the author of this note for any clarification.         Sosa Carlos MD  Internal Medicine   [facility]                       [1]   Outpatient Encounter Medications as of 6/12/2025   Medication Sig Dispense Refill    albuterol-budesonide (AIRSUPRA) 90-80 mcg/actuation Inhale 2 puffs into the lungs 2 (two) times a day. 10.7 g 1    estradioL (ESTRACE)  0.01 % (0.1 mg/gram) vaginal cream Place 1 g vaginally twice a week. 42.5 g 4    rosuvastatin (CRESTOR) 10 MG tablet Take 1 tablet (10 mg total) by mouth once daily. 90 tablet 3    triamcinolone acetonide 0.1% (KENALOG) 0.1 % ointment Apply topically 2 (two) times daily. 30 g 0    montelukast (SINGULAIR) 10 mg tablet Take 1 tablet (10 mg total) by mouth every evening. 30 tablet 0    [DISCONTINUED] meclizine (ANTIVERT) 25 mg tablet Take 1 tablet (25 mg total) by mouth 3 (three) times daily as needed for Dizziness. 52 tablet 0    [DISCONTINUED] pantoprazole (PROTONIX) 40 MG tablet Take 1 tablet (40 mg total) by mouth once daily. (Patient not taking: Reported on 6/12/2025) 90 tablet 1     Facility-Administered Encounter Medications as of 6/12/2025   Medication Dose Route Frequency Provider Last Rate Last Admin    [COMPLETED] pneumoc 20-emi conj-dip cr(PF) (PREVNAR-20 (PF)) injection Syrg 0.5 mL  0.5 mL Intramuscular 1 time in Clinic/HOD    0.5 mL at 06/12/25 0904

## 2025-06-12 NOTE — TELEPHONE ENCOUNTER
Contact and spoke with patient to schedule colonoscopy procedure. Patient declined first available at [place] and [place] Pm appointment. Patient request to schedule at [place] or [place] AM only. May you please contact patient to schedule.

## 2025-06-12 NOTE — PLAN OF CARE
Contact and spoke with patient to schedule colonoscopy procedure. Patient declined first available at [place] and [place] Pm appointment. Patient request to schedule at [place] or [place] AM only. May you please contact patient to schedule.

## 2025-07-11 ENCOUNTER — PATIENT MESSAGE (OUTPATIENT)
Dept: FAMILY MEDICINE | Facility: CLINIC | Age: 65
End: 2025-07-11
Payer: COMMERCIAL

## 2025-07-11 DIAGNOSIS — J30.9 CHRONIC ALLERGIC RHINITIS: ICD-10-CM

## 2025-07-11 RX ORDER — MONTELUKAST SODIUM 10 MG/1
10 TABLET ORAL NIGHTLY
Qty: 90 TABLET | Refills: 0 | Status: SHIPPED | OUTPATIENT
Start: 2025-07-11 | End: 2025-10-10

## 2025-07-11 NOTE — TELEPHONE ENCOUNTER
No care due was identified.  [facility] Embedded Care Due Messages. Reference number: [id number].   7/11/2025 9:21:18 AM CDT

## 2025-07-18 ENCOUNTER — PATIENT MESSAGE (OUTPATIENT)
Dept: GASTROENTEROLOGY | Facility: CLINIC | Age: 65
End: 2025-07-18
Payer: COMMERCIAL